# Patient Record
Sex: MALE | Race: WHITE | Employment: UNEMPLOYED | URBAN - METROPOLITAN AREA
[De-identification: names, ages, dates, MRNs, and addresses within clinical notes are randomized per-mention and may not be internally consistent; named-entity substitution may affect disease eponyms.]

---
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
## 2021-08-02 ENCOUNTER — HOSPITAL ENCOUNTER (OUTPATIENT)
Age: 21
Setting detail: OBSERVATION
Discharge: PSYCHIATRIC OR SUBSTANCE USE FACILITY | DRG: 754 | End: 2021-08-03
Attending: EMERGENCY MEDICINE | Admitting: INTERNAL MEDICINE
Payer: COMMERCIAL

## 2021-08-02 DIAGNOSIS — T43.8X2A: Primary | ICD-10-CM

## 2021-08-02 PROBLEM — T50.901A POLYSUBSTANCE OVERDOSE: Status: ACTIVE | Noted: 2021-08-02

## 2021-08-02 LAB
ALBUMIN SERPL-MCNC: 3.5 G/DL (ref 3.5–5)
ALBUMIN/GLOB SERPL: 0.9 {RATIO} (ref 1.1–2.2)
ALP SERPL-CCNC: 78 U/L (ref 45–117)
ALT SERPL-CCNC: 53 U/L (ref 12–78)
AMPHET UR QL SCN: NEGATIVE
ANION GAP SERPL CALC-SCNC: 10 MMOL/L (ref 5–15)
APAP SERPL-MCNC: <2 UG/ML (ref 10–30)
AST SERPL-CCNC: 24 U/L (ref 15–37)
BARBITURATES UR QL SCN: NEGATIVE
BASOPHILS # BLD: 0.1 K/UL (ref 0–0.1)
BASOPHILS NFR BLD: 1 % (ref 0–1)
BENZODIAZ UR QL: NEGATIVE
BILIRUB SERPL-MCNC: 0.4 MG/DL (ref 0.2–1)
BUN SERPL-MCNC: 14 MG/DL (ref 6–20)
BUN/CREAT SERPL: 17 (ref 12–20)
CALCIUM SERPL-MCNC: 8.9 MG/DL (ref 8.5–10.1)
CANNABINOIDS UR QL SCN: NEGATIVE
CHLORIDE SERPL-SCNC: 107 MMOL/L (ref 97–108)
CK SERPL-CCNC: 101 U/L (ref 39–308)
CO2 SERPL-SCNC: 24 MMOL/L (ref 21–32)
COCAINE UR QL SCN: NEGATIVE
CREAT SERPL-MCNC: 0.83 MG/DL (ref 0.7–1.3)
DIFFERENTIAL METHOD BLD: ABNORMAL
DRUG SCRN COMMENT,DRGCM: NORMAL
EOSINOPHIL # BLD: 0.1 K/UL (ref 0–0.4)
EOSINOPHIL NFR BLD: 1 % (ref 0–7)
ERYTHROCYTE [DISTWIDTH] IN BLOOD BY AUTOMATED COUNT: 12.5 % (ref 11.5–14.5)
ETHANOL SERPL-MCNC: <10 MG/DL
GLOBULIN SER CALC-MCNC: 4.1 G/DL (ref 2–4)
GLUCOSE SERPL-MCNC: 105 MG/DL (ref 65–100)
HCT VFR BLD AUTO: 45.7 % (ref 36.6–50.3)
HGB BLD-MCNC: 16.1 G/DL (ref 12.1–17)
IMM GRANULOCYTES # BLD AUTO: 0.1 K/UL (ref 0–0.04)
IMM GRANULOCYTES NFR BLD AUTO: 1 % (ref 0–0.5)
LYMPHOCYTES # BLD: 2.3 K/UL (ref 0.8–3.5)
LYMPHOCYTES NFR BLD: 23 % (ref 12–49)
MAGNESIUM SERPL-MCNC: 1.8 MG/DL (ref 1.6–2.4)
MCH RBC QN AUTO: 30.7 PG (ref 26–34)
MCHC RBC AUTO-ENTMCNC: 35.2 G/DL (ref 30–36.5)
MCV RBC AUTO: 87.2 FL (ref 80–99)
METHADONE UR QL: NEGATIVE
MONOCYTES # BLD: 0.9 K/UL (ref 0–1)
MONOCYTES NFR BLD: 9 % (ref 5–13)
NEUTS SEG # BLD: 6.8 K/UL (ref 1.8–8)
NEUTS SEG NFR BLD: 65 % (ref 32–75)
NRBC # BLD: 0 K/UL (ref 0–0.01)
NRBC BLD-RTO: 0 PER 100 WBC
OPIATES UR QL: NEGATIVE
PCP UR QL: NEGATIVE
PLATELET # BLD AUTO: 276 K/UL (ref 150–400)
PMV BLD AUTO: 9.1 FL (ref 8.9–12.9)
POTASSIUM SERPL-SCNC: 4.1 MMOL/L (ref 3.5–5.1)
PROT SERPL-MCNC: 7.6 G/DL (ref 6.4–8.2)
RBC # BLD AUTO: 5.24 M/UL (ref 4.1–5.7)
SALICYLATES SERPL-MCNC: <1.7 MG/DL (ref 2.8–20)
SODIUM SERPL-SCNC: 141 MMOL/L (ref 136–145)
TROPONIN I SERPL-MCNC: <0.05 NG/ML
WBC # BLD AUTO: 10.2 K/UL (ref 4.1–11.1)

## 2021-08-02 PROCEDURE — 82077 ASSAY SPEC XCP UR&BREATH IA: CPT

## 2021-08-02 PROCEDURE — 99218 HC RM OBSERVATION: CPT

## 2021-08-02 PROCEDURE — 36415 COLL VENOUS BLD VENIPUNCTURE: CPT

## 2021-08-02 PROCEDURE — 80053 COMPREHEN METABOLIC PANEL: CPT

## 2021-08-02 PROCEDURE — 80179 DRUG ASSAY SALICYLATE: CPT

## 2021-08-02 PROCEDURE — 99285 EMERGENCY DEPT VISIT HI MDM: CPT

## 2021-08-02 PROCEDURE — 93005 ELECTROCARDIOGRAM TRACING: CPT

## 2021-08-02 PROCEDURE — 85025 COMPLETE CBC W/AUTO DIFF WBC: CPT

## 2021-08-02 PROCEDURE — 80143 DRUG ASSAY ACETAMINOPHEN: CPT

## 2021-08-02 PROCEDURE — 80307 DRUG TEST PRSMV CHEM ANLYZR: CPT

## 2021-08-02 PROCEDURE — 82550 ASSAY OF CK (CPK): CPT

## 2021-08-02 PROCEDURE — 84484 ASSAY OF TROPONIN QUANT: CPT

## 2021-08-02 PROCEDURE — 83735 ASSAY OF MAGNESIUM: CPT

## 2021-08-02 RX ORDER — LANOLIN ALCOHOL/MO/W.PET/CERES
3 CREAM (GRAM) TOPICAL
Status: DISCONTINUED | OUTPATIENT
Start: 2021-08-02 | End: 2021-08-03 | Stop reason: HOSPADM

## 2021-08-02 RX ORDER — ENOXAPARIN SODIUM 100 MG/ML
40 INJECTION SUBCUTANEOUS DAILY
Status: DISCONTINUED | OUTPATIENT
Start: 2021-08-03 | End: 2021-08-03 | Stop reason: HOSPADM

## 2021-08-02 RX ORDER — FLUOXETINE HYDROCHLORIDE 20 MG/1
20 CAPSULE ORAL 3 TIMES DAILY
COMMUNITY
End: 2021-08-13

## 2021-08-02 RX ORDER — SODIUM CHLORIDE 0.9 % (FLUSH) 0.9 %
5-40 SYRINGE (ML) INJECTION AS NEEDED
Status: DISCONTINUED | OUTPATIENT
Start: 2021-08-02 | End: 2021-08-03 | Stop reason: HOSPADM

## 2021-08-02 RX ORDER — POLYETHYLENE GLYCOL 3350 17 G/17G
17 POWDER, FOR SOLUTION ORAL DAILY PRN
Status: DISCONTINUED | OUTPATIENT
Start: 2021-08-02 | End: 2021-08-03 | Stop reason: HOSPADM

## 2021-08-02 RX ORDER — SODIUM CHLORIDE 0.9 % (FLUSH) 0.9 %
5-40 SYRINGE (ML) INJECTION EVERY 8 HOURS
Status: DISCONTINUED | OUTPATIENT
Start: 2021-08-02 | End: 2021-08-03 | Stop reason: HOSPADM

## 2021-08-02 RX ORDER — SODIUM CHLORIDE 450 MG/100ML
75 INJECTION, SOLUTION INTRAVENOUS CONTINUOUS
Status: DISCONTINUED | OUTPATIENT
Start: 2021-08-02 | End: 2021-08-03 | Stop reason: HOSPADM

## 2021-08-03 ENCOUNTER — HOSPITAL ENCOUNTER (INPATIENT)
Age: 21
LOS: 10 days | Discharge: HOME | DRG: 754 | End: 2021-08-13
Attending: PSYCHIATRY & NEUROLOGY | Admitting: PSYCHIATRY & NEUROLOGY
Payer: COMMERCIAL

## 2021-08-03 VITALS
HEART RATE: 79 BPM | BODY MASS INDEX: 33.25 KG/M2 | SYSTOLIC BLOOD PRESSURE: 119 MMHG | DIASTOLIC BLOOD PRESSURE: 73 MMHG | TEMPERATURE: 98.2 F | RESPIRATION RATE: 16 BRPM | HEIGHT: 71 IN | OXYGEN SATURATION: 99 % | WEIGHT: 237.5 LBS

## 2021-08-03 DIAGNOSIS — F43.10 PTSD (POST-TRAUMATIC STRESS DISORDER): ICD-10-CM

## 2021-08-03 DIAGNOSIS — F43.21 ADJUSTMENT DISORDER WITH DEPRESSED MOOD: ICD-10-CM

## 2021-08-03 DIAGNOSIS — F60.3 BORDERLINE PERSONALITY DISORDER (HCC): ICD-10-CM

## 2021-08-03 PROBLEM — F32.A DEPRESSION: Status: ACTIVE | Noted: 2021-08-03

## 2021-08-03 LAB
ALBUMIN SERPL-MCNC: 3.3 G/DL (ref 3.5–5)
ALBUMIN/GLOB SERPL: 0.9 {RATIO} (ref 1.1–2.2)
ALP SERPL-CCNC: 74 U/L (ref 45–117)
ALT SERPL-CCNC: 47 U/L (ref 12–78)
ANION GAP SERPL CALC-SCNC: 11 MMOL/L (ref 5–15)
APAP SERPL-MCNC: <2 UG/ML (ref 10–30)
AST SERPL-CCNC: 21 U/L (ref 15–37)
ATRIAL RATE: 90 BPM
BILIRUB SERPL-MCNC: 0.3 MG/DL (ref 0.2–1)
BUN SERPL-MCNC: 12 MG/DL (ref 6–20)
BUN/CREAT SERPL: 15 (ref 12–20)
CALCIUM SERPL-MCNC: 8.5 MG/DL (ref 8.5–10.1)
CALCULATED P AXIS, ECG09: 56 DEGREES
CALCULATED R AXIS, ECG10: 15 DEGREES
CALCULATED T AXIS, ECG11: 19 DEGREES
CHLORIDE SERPL-SCNC: 104 MMOL/L (ref 97–108)
CO2 SERPL-SCNC: 25 MMOL/L (ref 21–32)
CREAT SERPL-MCNC: 0.81 MG/DL (ref 0.7–1.3)
DIAGNOSIS, 93000: NORMAL
ERYTHROCYTE [DISTWIDTH] IN BLOOD BY AUTOMATED COUNT: 12.6 % (ref 11.5–14.5)
FLUAV RNA SPEC QL NAA+PROBE: NOT DETECTED
FLUBV RNA SPEC QL NAA+PROBE: NOT DETECTED
GLOBULIN SER CALC-MCNC: 3.8 G/DL (ref 2–4)
GLUCOSE SERPL-MCNC: 120 MG/DL (ref 65–100)
HCT VFR BLD AUTO: 45.3 % (ref 36.6–50.3)
HGB BLD-MCNC: 15.7 G/DL (ref 12.1–17)
MCH RBC QN AUTO: 30.3 PG (ref 26–34)
MCHC RBC AUTO-ENTMCNC: 34.7 G/DL (ref 30–36.5)
MCV RBC AUTO: 87.5 FL (ref 80–99)
NRBC # BLD: 0 K/UL (ref 0–0.01)
NRBC BLD-RTO: 0 PER 100 WBC
P-R INTERVAL, ECG05: 158 MS
PLATELET # BLD AUTO: 250 K/UL (ref 150–400)
PMV BLD AUTO: 9 FL (ref 8.9–12.9)
POTASSIUM SERPL-SCNC: 3.5 MMOL/L (ref 3.5–5.1)
PROT SERPL-MCNC: 7.1 G/DL (ref 6.4–8.2)
Q-T INTERVAL, ECG07: 344 MS
QRS DURATION, ECG06: 86 MS
QTC CALCULATION (BEZET), ECG08: 420 MS
RBC # BLD AUTO: 5.18 M/UL (ref 4.1–5.7)
SALICYLATES SERPL-MCNC: <1.7 MG/DL (ref 2.8–20)
SARS-COV-2, COV2: NOT DETECTED
SODIUM SERPL-SCNC: 140 MMOL/L (ref 136–145)
VENTRICULAR RATE, ECG03: 90 BPM
WBC # BLD AUTO: 9.8 K/UL (ref 4.1–11.1)

## 2021-08-03 PROCEDURE — 80053 COMPREHEN METABOLIC PANEL: CPT

## 2021-08-03 PROCEDURE — 36415 COLL VENOUS BLD VENIPUNCTURE: CPT

## 2021-08-03 PROCEDURE — 65220000003 HC RM SEMIPRIVATE PSYCH

## 2021-08-03 PROCEDURE — 80143 DRUG ASSAY ACETAMINOPHEN: CPT

## 2021-08-03 PROCEDURE — 80179 DRUG ASSAY SALICYLATE: CPT

## 2021-08-03 PROCEDURE — 85027 COMPLETE CBC AUTOMATED: CPT

## 2021-08-03 PROCEDURE — 93005 ELECTROCARDIOGRAM TRACING: CPT

## 2021-08-03 PROCEDURE — 74011000250 HC RX REV CODE- 250: Performed by: INTERNAL MEDICINE

## 2021-08-03 PROCEDURE — 99218 HC RM OBSERVATION: CPT

## 2021-08-03 PROCEDURE — 87636 SARSCOV2 & INF A&B AMP PRB: CPT

## 2021-08-03 RX ORDER — HALOPERIDOL 5 MG/ML
5 INJECTION INTRAMUSCULAR
Status: DISCONTINUED | OUTPATIENT
Start: 2021-08-03 | End: 2021-08-13 | Stop reason: HOSPADM

## 2021-08-03 RX ORDER — IBUPROFEN 400 MG/1
400 TABLET ORAL
Status: DISCONTINUED | OUTPATIENT
Start: 2021-08-03 | End: 2021-08-03 | Stop reason: HOSPADM

## 2021-08-03 RX ORDER — ACETAMINOPHEN 325 MG/1
650 TABLET ORAL
Status: DISCONTINUED | OUTPATIENT
Start: 2021-08-03 | End: 2021-08-13 | Stop reason: HOSPADM

## 2021-08-03 RX ORDER — DIPHENHYDRAMINE HYDROCHLORIDE 50 MG/ML
50 INJECTION, SOLUTION INTRAMUSCULAR; INTRAVENOUS
Status: DISCONTINUED | OUTPATIENT
Start: 2021-08-03 | End: 2021-08-13 | Stop reason: HOSPADM

## 2021-08-03 RX ORDER — LORAZEPAM 2 MG/ML
1 INJECTION, SOLUTION INTRAMUSCULAR; INTRAVENOUS
Status: DISCONTINUED | OUTPATIENT
Start: 2021-08-03 | End: 2021-08-13 | Stop reason: HOSPADM

## 2021-08-03 RX ORDER — ADHESIVE BANDAGE
30 BANDAGE TOPICAL DAILY PRN
Status: DISCONTINUED | OUTPATIENT
Start: 2021-08-03 | End: 2021-08-13 | Stop reason: HOSPADM

## 2021-08-03 RX ORDER — HYDROXYZINE 25 MG/1
50 TABLET, FILM COATED ORAL
Status: DISCONTINUED | OUTPATIENT
Start: 2021-08-03 | End: 2021-08-13 | Stop reason: HOSPADM

## 2021-08-03 RX ORDER — BENZTROPINE MESYLATE 1 MG/1
1 TABLET ORAL
Status: DISCONTINUED | OUTPATIENT
Start: 2021-08-03 | End: 2021-08-13 | Stop reason: HOSPADM

## 2021-08-03 RX ORDER — TRAZODONE HYDROCHLORIDE 50 MG/1
50 TABLET ORAL
Status: DISCONTINUED | OUTPATIENT
Start: 2021-08-03 | End: 2021-08-05

## 2021-08-03 RX ORDER — OLANZAPINE 5 MG/1
5 TABLET ORAL
Status: DISCONTINUED | OUTPATIENT
Start: 2021-08-03 | End: 2021-08-13 | Stop reason: HOSPADM

## 2021-08-03 RX ADMIN — Medication 10 ML: at 00:41

## 2021-08-03 RX ADMIN — SODIUM CHLORIDE 75 ML/HR: 4.5 INJECTION, SOLUTION INTRAVENOUS at 00:40

## 2021-08-03 NOTE — PROGRESS NOTES
IDT met to discuss plan of care. RUR-   LOS- 1d  GLOS- observation  Barriers- None  Plans- Has IV, EKG today, plan to transfer to 3rd floor today.   Krystle Tim, BSW/CM  [phone]

## 2021-08-03 NOTE — GROUP NOTE
JOON  GROUP DOCUMENTATION INDIVIDUAL                                                                          Group Therapy Note    Date: 8/3/2021    Group Start Time: 1500  Group End Time: 1600  Group Topic: Recreational/Music Therapy    [facility] [place] [id number] ACUTE [facility]    [place], [address] [facility] GROUP DOCUMENTATION GROUP    Group Therapy Note    Attendees: 5         Attendance: Attended    Patient's Goal:  To concentrate on selected task    Interventions/techniques: Supported-crafts,games,music    Follows Directions: Followed directions    Interactions: Interacted appropriately    Mental Status: Calm and Flat    Behavior/appearance: Attentive, Cooperative and Needed prompting    Goals Achieved: Able to engage in interactions and Able to listen to others      Additional Notes:   Active participant in game    Andrey Gordon
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
## 2021-08-03 NOTE — ROUTINE PROCESS
TRANSFER - IN REPORT:    Verbal report received from Paulie, RN(name) on Reina Gong  being received from ED(unit) for routine progression of care      Report consisted of patients Situation, Background, Assessment and   Recommendations(SBAR). Information from the following report(s) SBAR, Kardex, ED Summary, Intake/Output, MAR and Recent Results was reviewed with the receiving nurse. Opportunity for questions and clarification was provided. Assessment completed upon patients arrival to unit and care assumed. Patient resting quietly in bed, sitter at bedside.

## 2021-08-03 NOTE — PROGRESS NOTES
0710) Bedside shift change report given to Ivana Zimmerman, RN (oncoming nurse) by Pablo Flower RN (offgoing nurse). Report included the following information SBAR, Kardex and MAR. 1010) IDR with Dr. Vinnie Mg (MD), Agnieszka David (pharmacist), Jayden Sotelo (), Kristen Mari (wound RN/infection control), and Ivana Zimmerman (RN) to discuss plan of care including plan to discharge to [facility], call to poison control  [phone]) Call to [facility], [address] [facility] to discuss EKG results, vitals, planning for the patient. No current symptoms. 1040) Discuss with Dr. Vinnie Mg, repeat labs not needed. Planning for discharge  [phone]) Pt discharged to [facility] with security and sitter .

## 2021-08-03 NOTE — BH NOTES
GROUP THERAPY PROGRESS NOTE    Patient is participating in Recreational Therapy/Coping Skills Group. Group time: 50 minutes      Personal goal for participation: To concentrate on selected task; positively engage in interactions with others; actively listen to others; self-disclose thoughts, emotions and behaviors; and discuss coping skills. Goal orientation: Personal    Group therapy participation: active    Therapeutic interventions reviewed and discussed: Group members participated in a music, art and game recreational therapy activity. Patients were able to actively participate in activity while engaging in conversation with staff and other patients. Patients appropriately socialized and processed presenting problems and coping skills while engaging in activity. Impression of participation: Pt with depressed mood, full affect, clear speech, goal directed thought stream, calm and cooperative, interacted appropriately with others. Pt completed art activity and processed his personal coping skills. Pt states he likes music, video games and watching TV. Pt processed memories from the past and goals for future. Pt asked appropriate questions to other patients.     MICHAEL Nance
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
## 2021-08-03 NOTE — PROGRESS NOTES
Reason for Admission:  Yazmin Coburn is a 21 y.o.  male who presents with above. Pt says that he is dealing with a lot of things \"too many things to get into. \" Reported overdose with 50 prozac tabs to ER tonight. Also had plan to jump from overpass                     RUR Score:                     Plan for utilizing home health:   none       PCP: First and Last name:  No primary care provider on file. Name of Practice:    Are you a current patient: Yes/No:    Approximate date of last visit:    Can you participate in a virtual visit with your PCP:                     Current Advanced Directive/Advance Care Plan: Full Code      Healthcare Decision Maker:   Click here to complete [address] including selection of the Healthcare Decision Maker Relationship (ie \"Primary\")                             Transition of Care Plan:      Met with patient for assessment. Had been living with 5 roommates at [address] [place] [address] in [facility]. Stated that he does not wish to return there and they were the cause of his distress leading to SI attempt. Release a month ago from [facility]. He was there for 3 years. Does not have a regular PCP. Takes Prozac and Vitamin D that were provided by [facility] when he was released. No working and stated that he has never worked. No income, received food stamps. CM discussed OBS status and patient signed OBS letter which was placed in chart and noted on Doc List.    Plan  1/ Patient to be transferred to 3rd floor for continued care. Care Management Interventions  PCP Verified by CM: Yes  Transition of Care Consult (CM Consult): Discharge Planning  Current Support Network:  Other (Lives with 3 roomates- does not want to return to this situation)  The Plan for Transition of Care is Related to the Following Treatment Goals : Disposition, Mental Health Services, Establish PCP  The Patient and/or Patient Representative was Provided with a Choice of Provider and Agrees with the Discharge Plan?: Yes  Name of the Patient Representative Who was Provided with a Choice of Provider and Agrees with the Discharge Plan: Patient  Discharge Location  Discharge Placement: Psychiatric Unit     SHRUTHI Lowe/ELLIS  [phone]

## 2021-08-03 NOTE — ED NOTES
Patient changed into green gown and hospital socks. Patient belongings secured in x 2 patient belonging bags with patient labels. Room check done. Patient calm and cooperative.

## 2021-08-03 NOTE — ED NOTES
TRANSFER - OUT REPORT:    Verbal report given to BEKAH Dunbar on [employer]  being transferred to [facility] Surg Observation for routine progression of care       Report consisted of patients Situation, Background, Assessment and   Recommendations(SBAR). Information from the following report(s) SBAR, Intake/Output, MAR, Recent Results, Med Rec Status and Cardiac Rhythm NSR was reviewed with the receiving nurse. Lines:       Opportunity for questions and clarification was provided.       Patient transported with:   Registered Nurse
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
## 2021-08-03 NOTE — PROGRESS NOTES
I went in the patients room and removed all ligature risks that could be removed except those needed to care for the patient. Patient is currently 1:1 with a U RN.

## 2021-08-03 NOTE — PROGRESS NOTES
.. TRANSFER - OUT REPORT:    Verbal report given to Trish(name) on Varun Rivas  being transferred to [facility]) for routine progression of care       Report consisted of patients Situation, Background, Assessment and   Recommendations(SBAR). Information from the following report(s) SBAR, Kardex and MAR was reviewed with the receiving nurse. Lines:   Peripheral IV 08/03/21 Left Antecubital (Active)   Site Assessment Clean, dry, & intact 08/03/21 0048   Phlebitis Assessment 0 08/03/21 0048   Infiltration Assessment 0 08/03/21 0048   Dressing Status Clean, dry, & intact 08/03/21 0048   Dressing Type Tape;Transparent 08/03/21 0048   Hub Color/Line Status Pink;Capped; Infusing 08/03/21 0048   Alcohol Cap Used No 08/03/21 0048        Opportunity for questions and clarification was provided.       Patient transported with:  Daylin Fitzpatrick

## 2021-08-03 NOTE — PROGRESS NOTES
Problem: Activity Intolerance  Goal: *Oxygen saturation during activity within specified parameters  Outcome: Progressing Towards Goal  Goal: *Able to remain out of bed as prescribed  Outcome: Progressing Towards Goal     Problem: Falls - Risk of  Goal: *Absence of Falls  Description: Document [place] Josiane Fall Risk and appropriate interventions in the flowsheet.   Outcome: Progressing Towards Goal  Note: Fall Risk Interventions:                                Problem: Pain  Goal: *Control of Pain  Outcome: Progressing Towards Goal  Goal: *PALLIATIVE CARE:  Alleviation of Pain  Outcome: Progressing Towards Goal

## 2021-08-03 NOTE — GROUP NOTE
JOON  GROUP DOCUMENTATION INDIVIDUAL                                                                          Group Therapy Note    Date: 8/3/2021    Group Start Time: 1100  Group End Time: 1200  Group Topic: Topic Group    [address] ACUTE BEHAV [place] [address] GROUP    Group Therapy Note    Attendees: 4         Attendance: Did not attend    Patient's Goal:      Interventions/techniquesZaria Maldonado
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
## 2021-08-03 NOTE — ED NOTES
X 1 forensic restraint placed on patient by [place] [facility] to patient's L wrist connected to side rail. Skin intact, no breakdown noted. Will continue to monitor.
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
## 2021-08-04 PROBLEM — F43.21 ADJUSTMENT DISORDER WITH DEPRESSED MOOD: Status: ACTIVE | Noted: 2021-08-04

## 2021-08-04 PROBLEM — F32.A DEPRESSION: Status: RESOLVED | Noted: 2021-08-03 | Resolved: 2021-08-04

## 2021-08-04 PROBLEM — F60.3 BORDERLINE PERSONALITY DISORDER (HCC): Status: ACTIVE | Noted: 2021-08-04

## 2021-08-04 PROBLEM — F43.10 PTSD (POST-TRAUMATIC STRESS DISORDER): Status: ACTIVE | Noted: 2021-08-04

## 2021-08-04 LAB
ATRIAL RATE: 70 BPM
CALCULATED P AXIS, ECG09: 53 DEGREES
CALCULATED R AXIS, ECG10: 19 DEGREES
CALCULATED T AXIS, ECG11: 13 DEGREES
CHOLEST SERPL-MCNC: 163 MG/DL
DIAGNOSIS, 93000: NORMAL
GLUCOSE P FAST SERPL-MCNC: 85 MG/DL (ref 65–100)
HDLC SERPL-MCNC: 37 MG/DL
HDLC SERPL: 4.4 {RATIO} (ref 0–5)
LDLC SERPL CALC-MCNC: 55.2 MG/DL (ref 0–100)
P-R INTERVAL, ECG05: 164 MS
Q-T INTERVAL, ECG07: 398 MS
QRS DURATION, ECG06: 90 MS
QTC CALCULATION (BEZET), ECG08: 429 MS
TRIGL SERPL-MCNC: 354 MG/DL (ref ?–150)
TSH SERPL DL<=0.05 MIU/L-ACNC: 1.36 UIU/ML (ref 0.36–3.74)
VENTRICULAR RATE, ECG03: 70 BPM
VLDLC SERPL CALC-MCNC: 70.8 MG/DL

## 2021-08-04 PROCEDURE — 80061 LIPID PANEL: CPT

## 2021-08-04 PROCEDURE — 65220000003 HC RM SEMIPRIVATE PSYCH

## 2021-08-04 PROCEDURE — 82947 ASSAY GLUCOSE BLOOD QUANT: CPT

## 2021-08-04 PROCEDURE — 99223 1ST HOSP IP/OBS HIGH 75: CPT | Performed by: PSYCHIATRY & NEUROLOGY

## 2021-08-04 PROCEDURE — 36415 COLL VENOUS BLD VENIPUNCTURE: CPT

## 2021-08-04 PROCEDURE — 84443 ASSAY THYROID STIM HORMONE: CPT

## 2021-08-04 NOTE — BH NOTES
0800 Report given from Marissa Vergara. Pt is visible on the unit in the dayroom watching tv.     1000 Pt is visible on the unit. Calm and cooperative. Pt does state he is anxious 2/10 and depression 2/10. Pt denies si/hi/a/v mendoza. Attending groups and socializing with peers. Accepting meals. 1200 pt ate lunch. Visible on the unit watching tv.    1400 pt is visible on the unit in the dayroom. 1600 pt is visible on the unit. Ate dinner. 1800 Pt is visible on the unit. Calm and cooperative.

## 2021-08-04 NOTE — BH NOTES
GROUP THERAPY PROGRESS NOTE    Patient is participating in Coping Skills group. Group time: 45 minutes    Personal goal for participation: To practice naming emotions and processing memories of emotions. Goal orientation: Personal    Group therapy participation: active    Therapeutic interventions reviewed and discussed: Group members worked on a word search of feeling words/emotions. After a they had time to find some of the words patients were asked to read the list of emotions/feeling words and share which word they reacted to the most. Each member was able to share a memory or statement they try to remember for each word. Impression of participation: Michelle Bo was present in group. He shared that he enjoys games and named a bunch of games that were card games like [place] [id number]. He shared that he enjoys anime and other comics. He interacted appropriately with others in group and was very active in discussion.     Susan Yuan LPC LSATP [facility]

## 2021-08-04 NOTE — PROGRESS NOTES
Problem: Suicide  Goal: *STG: Remains safe in hospital  Outcome: Progressing Towards Goal  Goal: *STG: Seeks staff when feelings of self harm or harm towards others arise  Outcome: Progressing Towards Goal  Goal: *STG: Attends activities and groups  Outcome: Progressing Towards Goal  Goal: *STG:  Verbalizes alternative ways of dealing with maladaptive feelings/behaviors  Outcome: Progressing Towards Goal  Goal: *STG/LTG: Complies with medication therapy  Outcome: Progressing Towards Goal     Problem: Anxiety  Goal: *Alleviation of anxiety  Outcome: Progressing Towards Goal  Goal: *Alleviation of anxiety (Palliative Care)  Outcome: Progressing Towards Goal     Problem: Depressed Mood (Adult/Pediatric)  Goal: *STG: Participates in treatment plan  Outcome: Progressing Towards Goal  Goal: *STG: Participates in 1:1 therapy sessions  Outcome: Progressing Towards Goal  Goal: *STG: Verbalizes anger, guilt, and other feelings in a constructive manor  Outcome: Progressing Towards Goal  Goal: *STG: Attends activities and groups  Outcome: Progressing Towards Goal

## 2021-08-04 NOTE — PROGRESS NOTES
Problem: Discharge Planning  Goal: *Discharge to safe environment  Outcome: Not Progressing Towards Goal  Note: Patient  states he will not be returning to the [facility] house and does not report other options. Goal: *Knowledge of medication management  Outcome: Progressing Towards Goal  Note: Patient verbalizes understanding of medication regimen. Patient is taking medications as prescribed. Goal: *Knowledge of discharge instructions  Outcome: Progressing Towards Goal  Note: Patient verbalizes understanding of goals for treatment and safe discharge.
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
## 2021-08-04 NOTE — BH NOTES
GROUP THERAPY PROGRESS NOTE    Patient is participating in Coping Skills group. Group time: 45 minutes     Personal goal for participation: Learn coping skills to improve mental health, reduce stress and work through uncomfortable emotions    Goal orientation: Personal    Group therapy participation: active    Therapeutic interventions reviewed and discussed: Group discussion on ways patients are coping with mental health needs, stress and uncomfortable emotions. Group members discussed alternative positive coping skills using each letter of the alphabet, resulting in patients having a list of 26+ positive coping skills to access. Patients discussed the difference between positive and negative coping skills. Impression of participation: Pt with odd affect, anxious mood, clear speech, goal directed thought stream, limited insight and judgement. Pt reported needing to cope with violating parole for not calling hospital within 24 hours of changing location and is concerned about the consequences. Pt processed hx of self harm and stated he wants to get body stitching done. Pt has limited insight into self-harm as a negative coping skill. Pt processed hx of incarceration and being a registered sex offender. Pt processed positive ways to cope with mental health challenging including therapy, reading, talking to others.     MICHAEL Roche

## 2021-08-04 NOTE — PROGRESS NOTES
Behavioral Services  Medicare Certification Upon Admission    I certify that this patient's inpatient psychiatric hospital admission is medically necessary for:    [x] (1) Treatment which could reasonably be expected to improve this patient's condition,       [x] (2) Or for diagnostic study;     AND     [x](2) The inpatient psychiatric services are provided while the individual is under the care of a physician and are included in the individualized plan of care.     Estimated length of stay/service 3-5 days    Plan for post-hospital care home    Electronically signed by Thu Medina MD on 8/4/2021 at 9:53 AM

## 2021-08-04 NOTE — PROGRESS NOTES
Problem: Suicide  Goal: *STG: Remains safe in hospital  Outcome: Progressing Towards Goal     Problem: Depressed Mood (Adult/Pediatric)  Goal: *STG: Participates in treatment plan  Outcome: Progressing Towards Goal  Goal: *STG: Remains safe in hospital  Outcome: Progressing Towards Goal     Verbal shift change report given to Kenisha carson (oncoming nurse) by Venkat Yin (offgoing nurse). Report included the following information SBAR, Kardex, MAR, and Recent Results. Patient denies SI/HI/AVH. Patient sitting in day room conversing appropriately with peers. Patient endorsed mild anxiety 3/10 and when asked about depression, patient stated, \"I'm not sad, I just don't care about anything. \" Patient presents as somewhat guarded at this time. Patient noted that he has trouble sleeping and has tried trazodone, melatonin and ambien without success. No further issues noted at this time. [id number] Patient cooperative and labs obtained. No further issues noted at this time.

## 2021-08-04 NOTE — BH NOTES
GROUP THERAPY PROGRESS NOTE    Patient is participating in Discharge Planning Group. Group time: 30 minutes    Personal goal for participation: Process feelings related to discharge and/or feelings/goals for today. Goal orientation: Personal    Group therapy participation: active    Therapeutic interventions reviewed and discussed: Group discussion was focused on discharge plans and anxiety related to this. Group members discussed what they planned to do once discharged and discharge plans. Patients discussed their goals for today and what they are working on with treatment team to get ready for discharge. Patients were reminded of things to consider including who they will see outpatient (psychiatrist/NP, , therapist/counselor), where they will go, how they will get there, and where they want to get their medications from. Patients were also reminded or informed who their treatment team is while they are in the hospital, some important rules on the unit, and the schedule for todays groups and meals. Impression of participation: Hilaria Lesches was present in group. He shared his coping skills and asked questions about admission. He was informed of his treatment team members and that he would need to speak to them about his concerns for where to live and how to get there. He reported understanding of the unit, schedule, and discharge process.     Myrna Childress [facility]

## 2021-08-04 NOTE — GROUP NOTE
JOON  GROUP DOCUMENTATION INDIVIDUAL                                                                          Group Therapy Note    Date: 8/4/2021    Group Start Time: 1000  Group End Time: 1100  Group Topic: Topic Group    [address] [facility] 3 ACUTE BEHAV [place], [address] [facility] GROUP DOCUMENTATION GROUP    Group Therapy Note    Attendees: 6         Attendance: Attended    Patient's Goal:  To identify positive and negative coping skills    Interventions/techniques: Supported-discussion        Interactions: Interacted appropriately    Mental Status: Calm and Flat    Behavior/appearance: Cooperative and Needed prompting    Goals Achieved: Able to engage in interactions, Able to listen to others, Able to self-disclose and Discussed coping      Additional Notes:      Andrey Gordon

## 2021-08-04 NOTE — GROUP NOTE
JOON  GROUP DOCUMENTATION INDIVIDUAL                                                                          Group Therapy Note    Date: 8/4/2021    Group Start Time: 1400  Group End Time: 1500  Group Topic: Recreational/Music Therapy    [facility] [place] [address] ACUTE BEHAV [facility]    [place], [address] [facility] GROUP DOCUMENTATION GROUP    Group Therapy Note    Attendees: 8         Attendance: Attended    Patient's Goal:  To concentrate on selected task    Interventions/techniques: Supported-crafts,games,music        Interactions: Interacted appropriately    Mental Status: Calm and Flat    Behavior/appearance: Needed prompting    Goals Achieved:  Attended group when encouraged      Additional Notes:  Arrived late -reported feeling tired,elected to watch    GenSpera

## 2021-08-05 PROCEDURE — 65220000003 HC RM SEMIPRIVATE PSYCH

## 2021-08-05 PROCEDURE — 74011250637 HC RX REV CODE- 250/637: Performed by: PSYCHIATRY & NEUROLOGY

## 2021-08-05 RX ORDER — DOXEPIN HYDROCHLORIDE 10 MG/1
10 CAPSULE ORAL
Status: DISCONTINUED | OUTPATIENT
Start: 2021-08-05 | End: 2021-08-06

## 2021-08-05 RX ADMIN — DOXEPIN HYDROCHLORIDE 10 MG: 10 CAPSULE ORAL at 21:42

## 2021-08-05 NOTE — BH NOTES
Pt is A&OX4. Pt denies SI/HI. A/VH= \" Pt states, I don't know what's real or not real, I don't trust myself. \"  Last BM=yesterday. Pt is visible in the milieu, mostly watching tv in the dayroom. No issues observed or reported. Pt stays to himself. Will continue to monitor. Pt is diet compliant.

## 2021-08-05 NOTE — PROGRESS NOTES
Problem: Suicide  Goal: *STG: Remains safe in hospital  Outcome: Progressing Towards Goal  Goal: *STG: Seeks staff when feelings of self harm or harm towards others arise  Outcome: Progressing Towards Goal     Problem: Anxiety  Goal: *Alleviation of anxiety  Outcome: Progressing Towards Goal     1930- Verbal shift change report given to Giovanna Stoner.  (oncoming nurse) by Dimas Luna (offgoing nurse). Report included the following information SBAR, Kardex, MAR, and Recent Results. 2030- Patient denies SI/HI/AVH. Patient endorses anxiety and depression but states he feels much better than yesterday. 2300- Patient intermittently in day room and in hallway speaking to staff. No further issues noted at this time. 0130- Patient continues to stay awake. Writer asked patient if he wanted to try any medication to facilitate sleep. Patient politely declined. [id number] Patient appears asleep in bed at this time. No further issues noted presently.

## 2021-08-05 NOTE — BH NOTES
GROUP THERAPY PROGRESS NOTE    Patient did not participate in Substance abuse/Coping Skills group.      Ronaldo Mccoy LPC LSATP [facility]

## 2021-08-05 NOTE — GROUP NOTE
JOON  GROUP DOCUMENTATION INDIVIDUAL                                                                          Group Therapy Note    Date: 8/5/2021    Group Start Time: 1400  Group End Time: 1500  Group Topic: Recreational/Music Therapy    [facility] [place] [address] ACUTE BEHAV [facility]    [place], [address] [facility] GROUP DOCUMENTATION GROUP    Group Therapy Note    Attendees: 9         Attendance: Attended    Patient's Goal:  To concentrate on selected task    Interventions/techniques: Supported-crafts,games,music    Follows Directions:  Followed directions    Interactions: Interacted appropriately    Mental Status: Calm    Behavior/appearance: Attentive and Cooperative    Goals Achieved: Able to engage in interactions and Able to listen to others      Additional Notes:  Pleasant-socialized with peers and Jim Acuna

## 2021-08-05 NOTE — BH NOTES
Behavioral Health Treatment Team Note         Patient goal(s) for today: Take medications as prescribed. Treatment team focus/goals: Medication adjustments. Progress note Pt reports he remains anxious and overwhelmed. SW left voicemail's for pts PO and PO supervisors. LOS:  2  Expected LOS: TBD    Financial concerns/prescription coverage:[employer] [place] - Voluntary  Date of last family contact: None     Family requesting physician contact today:  No  Discharge plan: TBD  Guns in the home: None reported. Outpatient provider(s):  To be linked    Participating treatment team members: Laverne Fatima, MSW and Dr. Arun Owens

## 2021-08-05 NOTE — BH NOTES
GROUP THERAPY PROGRESS NOTE    Patient is participating in Substance abuse/Coping Skills group. Group time: 50 minutes    Personal goal for participation: To understand values and how behaviors impact values    Goal orientation: Personal    Group therapy participation: minimal to active    Therapeutic interventions reviewed and discussed: Group discussion of values and how behaviors and actions have been in agreement or disagreement with values. Patients able to share their thoughts on values and value related questions designed for patients to explore their values and beliefs. Impression of participation: Husam Min was present in group. He reports that he is unsure of many of the questions asked of him. He reports that if he had unlimited fortune he would put it in an offshore account, get a new identity and move to a new country. When asked where he stated [place] because he wants to start over.     Dipti Landeros LPC LSATP [facility]

## 2021-08-06 PROCEDURE — 99232 SBSQ HOSP IP/OBS MODERATE 35: CPT | Performed by: PSYCHIATRY & NEUROLOGY

## 2021-08-06 PROCEDURE — 65220000003 HC RM SEMIPRIVATE PSYCH

## 2021-08-06 RX ORDER — DOXEPIN HYDROCHLORIDE 10 MG/1
10 CAPSULE ORAL
Status: DISCONTINUED | OUTPATIENT
Start: 2021-08-06 | End: 2021-08-13 | Stop reason: HOSPADM

## 2021-08-06 NOTE — BH NOTES
Psychiatric Progress Note    Patient: Amisha Saucedo MRN: [id number]  SSN: [id number]    YOB: 2000  Age: 21 y.o. Sex: male      Admit Date: 8/3/2021    LOS: 3 days     Subjective:     Amisha Saucedo  reports feeling tired and irritable due to the noise level on this unit. Moods are fair. Denies SI/HI/AH/VH. No aggression or violence. Appropriately interactive and aware. Tolerating medications well except that he feels none of them. Eating ok and sleeping fairly at 7 hrs.    8/6 - overnight, patient has been visible, he slept 7 hours and denies any depressed mood this morning. He is withdrawn per nursing, and reports anxiety at a '7/10' in intensity. Patient reports feeling confused and disoriented thought he is calm and pleasant on interview. He reports the Doxepin contributed to this, stating it is too intense of a medication. Patient discharge focused, asking what his options were upon release. He denies active thoughts of self harm.     Objective:     Vitals:    08/04/21 2050 08/05/21 0800 08/05/21 2030 08/06/21 0957   BP: 137/81 111/75 126/75 121/78   Pulse: 86 81 86 88   Resp: 16 16 14 16   Temp: 98.3 °F (36.8 °C) 98.4 °F (36.9 °C) 98.8 °F (37.1 °C) 98.9 °F (37.2 °C)   SpO2: 100% 100% 96% 99%   Weight:       Height:            Mental Status Exam:   Sensorium  oriented to time, place and person   Relations cooperative   Eye Contact appropriate   Appearance:  age appropriate   Speech:  normal volume and non-pressured   Thought Process: goal directed   Thought Content free of delusions, free of hallucinations, and internally preoccupied    Suicidal ideations none   Mood:  depressed   Affect:  constricted   Memory   adequate   Concentration:  adequate   Insight:  fair   Judgment:  limited       MEDICATIONS:  Current Facility-Administered Medications   Medication Dose Route Frequency    doxepin (SINEquan) capsule 10 mg  10 mg Oral QHS PRN    OLANZapine (ZyPREXA) tablet 5 mg  5 mg Oral Q6H PRN    haloperidol lactate (HALDOL) injection 5 mg  5 mg IntraMUSCular Q6H PRN    benztropine (COGENTIN) tablet 1 mg  1 mg Oral BID PRN    diphenhydrAMINE (BENADRYL) injection 50 mg  50 mg IntraMUSCular BID PRN    hydrOXYzine HCL (ATARAX) tablet 50 mg  50 mg Oral TID PRN    LORazepam (ATIVAN) injection 1 mg  1 mg IntraMUSCular Q4H PRN    acetaminophen (TYLENOL) tablet 650 mg  650 mg Oral Q4H PRN    magnesium hydroxide (MILK OF MAGNESIA) 400 mg/5 mL oral suspension 30 mL  30 mL Oral DAILY PRN      DISCUSSION:   the risks and benefits of the proposed medication  patient given opportunity to ask questions    Lab/Data Review: All lab results for the last 24 hours reviewed. No results found for this or any previous visit (from the past 24 hour(s)). Assessment:     Principal Problem:    Adjustment disorder with depressed mood (8/4/2021)    Active Problems:    Borderline personality disorder ([facility] [address] (8/4/2021)      PTSD (post-traumatic stress disorder) (8/4/2021)        Plan:     Continue current care  Collateral information  - CHANGE Doxepin to PRN  Needs to contact  for legal issues  Disposition planning with social work    I certify that this patient's inpatient psychiatric hospital services furnished since the previous certification were, and continue to be, required for treatment that could reasonably be expected to improve the patient's condition, or for diagnostic study, and that the patient continues to need, on a daily basis, active treatment furnished directly by or requiring the supervision of inpatient psychiatric facility personnel. In addition, the hospital records show that services furnished were intensive treatment services, admission or related services, or equivalent services.   Signed By: Lola Hill MD     August 6, 2021

## 2021-08-06 NOTE — BH NOTES
GROUP THERAPY PROGRESS NOTE    Patient did not participate in Discharge Planning Group.      Duy Almanzar LPC LSATP [facility]

## 2021-08-06 NOTE — GROUP NOTE
JOON  GROUP DOCUMENTATION INDIVIDUAL                                                                          Group Therapy Note    Date: 8/6/2021    Group Start Time: 1000  Group End Time: 1100  Group Topic: Topic Group    [facility] [place] 3 ACUTE BEHAV [facility]    Tevin Alvarado [address] GROUP    Group Therapy Note    Attendees: 8         Attendance: Did not attend    Patient's Goal:    Asif Gautam

## 2021-08-06 NOTE — PROGRESS NOTES
Problem: Suicide  Goal: *STG: Remains safe in hospital  Outcome: Progressing Towards Goal  Goal: *STG: Seeks staff when feelings of self harm or harm towards others arise  Outcome: Progressing Towards Goal  Goal: *STG/LTG: Complies with medication therapy  Outcome: Progressing Towards Goal  Goal: *LTG:  Identifies available community resources  Outcome: Progressing Towards Goal     0314-4313: Assumed care of patient after receiving shift report from outgoing nurse. Patient was out and visible on unit, interacting with peers and staff. Pt cooperative with vitals and assessment. Insight and Concentration present. Pt makes good eye contact. Affect was flat, mood was anxious. A&O x 4. Hygiene is adequate, independent in ADLs. Gait is steady. Sleep is . Appetite patterns WNL. Denies AVH/SI/HI/Anxiety/Depression. Last BM was today. Pt denies pain. Pt encouraged to continue to participate in care. Patient medication and diet compliant. Patient sociable and interacting appropriately with peers. Patient currently watching television at this time. 4675-3321: Patient transferred to general side of unit per order. No further issues noted at this time. 5887-3647: Patient appears to be asleep at this time. 0433-3226: Pt has been observed throughout the night. Total hours slept approximately 7.5. No s/s of distress noted. Patient has remained safe.

## 2021-08-06 NOTE — PROGRESS NOTES
Received verbal shift report from Richard Lind, [address] [facility]. Assumed care of pt. Received pt awake in bed, NAD. Denies si/hi/avh and pain. Mood is depressed with dull affect. Rates anxiety 7/10. Med and meal compliant. Did not attend groups. Interacting appropriately with peers and staff. Will continue to monitor    1600: pt watching tv in DR, talking with peer.  NAD      Problem: Depressed Mood (Adult/Pediatric)  Goal: *STG: Participates in treatment plan  Outcome: Progressing Towards Goal  Goal: *STG: Complies with medication therapy  Outcome: Progressing Towards Goal

## 2021-08-06 NOTE — GROUP NOTE
JOON  GROUP DOCUMENTATION INDIVIDUAL                                                                          Group Therapy Note    Date: 8/6/2021    Group Start Time: 1400  Group End Time: 1500  Group Topic: Recreational/Music Therapy    [facility] [place] [address] ACUTE BEHAV [facility]    [place], [address] [facility] GROUP DOCUMENTATION GROUP    Group Therapy Note    Attendees: 10         Attendance: Attended    Patient's Goal:  To concentrate on selected task    Interventions/techniques: Supported-crafts,games,music    Follows Directions:  Followed directions    Interactions: Interacted appropriately    Mental Status: Calm    Behavior/appearance: Attentive and Cooperative    Goals Achieved: Able to engage in interactions and Able to listen to others      Additional Notes:      Ottoniel Pierre

## 2021-08-06 NOTE — BH NOTES
Psychiatric Progress Note    Patient: Tamar Arana MRN: [id number]  SSN: [id number]    YOB: 2000  Age: 21 y.o. Sex: male      Admit Date: 8/3/2021    LOS: 2 days     Subjective:     Tamar Arana  reports feeling tired and irritable due to the noise level on this unit. Moods are fair. Denies SI/HI/AH/VH. No aggression or violence. Appropriately interactive and aware. Tolerating medications well except that he feels none of them. Eating ok and sleeping fairly at 7 hrs.     Objective:     Vitals:    08/03/21 2128 08/04/21 0725 08/04/21 2050 08/05/21 0800   BP: 121/70 108/74 137/81 111/75   Pulse: 84 69 86 81   Resp: 16 18 16 16   Temp: 98.2 °F (36.8 °C) 98.8 °F (37.1 °C) 98.3 °F (36.8 °C) 98.4 °F (36.9 °C)   SpO2: 96% 100% 100% 100%   Weight:       Height:            Mental Status Exam:   Sensorium  oriented to time, place and person   Relations cooperative   Eye Contact appropriate   Appearance:  age appropriate   Speech:  normal volume and non-pressured   Thought Process: goal directed   Thought Content free of delusions, free of hallucinations, and internally preoccupied    Suicidal ideations none   Mood:  depressed   Affect:  constricted   Memory   adequate   Concentration:  adequate   Insight:  fair   Judgment:  limited       MEDICATIONS:  Current Facility-Administered Medications   Medication Dose Route Frequency    doxepin (SINEquan) capsule 10 mg  10 mg Oral QHS    OLANZapine (ZyPREXA) tablet 5 mg  5 mg Oral Q6H PRN    haloperidol lactate (HALDOL) injection 5 mg  5 mg IntraMUSCular Q6H PRN    benztropine (COGENTIN) tablet 1 mg  1 mg Oral BID PRN    diphenhydrAMINE (BENADRYL) injection 50 mg  50 mg IntraMUSCular BID PRN    hydrOXYzine HCL (ATARAX) tablet 50 mg  50 mg Oral TID PRN    LORazepam (ATIVAN) injection 1 mg  1 mg IntraMUSCular Q4H PRN    acetaminophen (TYLENOL) tablet 650 mg  650 mg Oral Q4H PRN    magnesium hydroxide (MILK OF MAGNESIA) 400 mg/5 mL oral suspension 30 mL  30 mL Oral DAILY PRN      DISCUSSION:   the risks and benefits of the proposed medication  patient given opportunity to ask questions    Lab/Data Review: All lab results for the last 24 hours reviewed. No results found for this or any previous visit (from the past 24 hour(s)). Assessment:     Principal Problem:    Adjustment disorder with depressed mood (8/4/2021)    Active Problems:    Borderline personality disorder ([address] (8/4/2021)      PTSD (post-traumatic stress disorder) (8/4/2021)        Plan:     Continue current care  Collateral information  Needs to contact  for legal issues  Disposition planning with social work    I certify that this patient's inpatient psychiatric hospital services furnished since the previous certification were, and continue to be, required for treatment that could reasonably be expected to improve the patient's condition, or for diagnostic study, and that the patient continues to need, on a daily basis, active treatment furnished directly by or requiring the supervision of inpatient psychiatric facility personnel. In addition, the hospital records show that services furnished were intensive treatment services, admission or related services, or equivalent services.   Signed By: Jaycee Christopher MD     August 5, 2021

## 2021-08-06 NOTE — BH NOTES
Behavioral Health Treatment Team Note            Patient goal(s) for today: Take medications as prescribed. Treatment team focus/goals: Medication adjustments. Progress note Pt reports he remains anxious and overwhelmed. PT, SW and PO met to discussed effective communication and discharge planning.      LOS:  3                        Expected LOS: 8/11?     Financial concerns/prescription coverage:[employer] [facility] [place] - Voluntary  Date of last family contact: None                                  Family requesting physician contact today:  No  Discharge plan: TBD  Guns in the home: None reported. Outpatient provider(s):  To be linked     Participating treatment team members: Austen Moreira MSW and Dr. Cristina Rivas

## 2021-08-07 PROCEDURE — 99231 SBSQ HOSP IP/OBS SF/LOW 25: CPT | Performed by: PSYCHIATRY & NEUROLOGY

## 2021-08-07 PROCEDURE — 65220000003 HC RM SEMIPRIVATE PSYCH

## 2021-08-07 NOTE — BH NOTES
GROUP THERAPY PROGRESS NOTE    Patient is participating in Coping Skills group. Group time: 50 Minutes     Personal goal for participation: To assess current protective factors and discuss building adequate holistic protective factors to improve ability to cope with mental health challenges and symptoms. Goal orientation: Personal    Group therapy participation: passive    Therapeutic interventions reviewed and discussed:  Patients were provided psychoeducation on the importance of controlled and holistic protective factors. Patients completed a Protective Factors worksheet to assess how strong their controlled protective factors are in their social supports, physical health, self-esteem, coping skills, sense of purpose and healthy thinking. Patients discussed results of assessment and processed how protective factors have been valuable to them during mental health crisis. Patients also discussed which protective factors they would like to improve, how things may be different if they were able to improve these factors and identified specific action steps to help make this goal a reality. Impression of participation:  Pt presented with anxious mood, poor concentration, interacted minimally with others. Pt stated he is doing \"fine\" today, declined to elaborate. Pt completed drawing/coloring activity in dayroom and did not verbally participate in group.     Nakul Giang, MICHAEL

## 2021-08-07 NOTE — PROGRESS NOTES
0715: Bedside shift change report given to Ramón Sin RN (oncoming nurse) by ST HARLEY RN (offgoing nurse). Report included the following information SBAR, Kardex, Intake/Output, MAR, Accordion, Recent Results, and Med Rec Status. 9393: Assumed care of pt resting quietly in bed. AO x 4. Reports sore throat, but says this is normal for him in the morning and he just needs to something to drink. Denies anxiety, depression, SI and HI. States he is \"unsure\" if he is experiencing AVH as he \"cannot always trust my mental status or memories. \"  Pt is pleasant and cooperative, appropriate eye contact and interaction with staff. 3851-4858: Med/meal compliant. Ate breakfast in day room. Reports improved sore throat after eating/drinking. NAD, no behavioral concerns, no PRNs. Has been present on unit watching TV in day room. 5410-5172: Pt has been present on unit watching TV. Attended group. NAD, no behavioral concerns, no PRNs. 7296-3610: Pt has been resting quietly in bed. Ate lunch in day room. NAD, no behavioral concerns, no PRNs . 5614-0042: pt has been present on unit, watching TV. NAD, no behavioral concerns, no PRN. 3164-5740: No change to previous assessment. Pt appropriately interacting with peers/staff. NAD, no behavioral concerns, no PRNs. Problem: Anxiety  Goal: *Alleviation of anxiety  Outcome: Progressing Towards Goal     Problem: Depressed Mood (Adult/Pediatric)  Goal: *STG: Participates in treatment plan  Outcome: Progressing Towards Goal  Goal: *STG: Remains safe in hospital  Outcome: Progressing Towards Goal     Problem: Falls - Risk of  Goal: *Absence of Falls  Description: Document Geoff Fall Risk and appropriate interventions in the flowsheet.   Outcome: Progressing Towards Goal  Note: Fall Risk Interventions:            Medication Interventions: Teach patient to arise slowly

## 2021-08-07 NOTE — PROGRESS NOTES
Problem: Anxiety  Goal: *Alleviation of anxiety  Outcome: Progressing Towards Goal  Goal: *Alleviation of anxiety (Palliative Care)  Outcome: Progressing Towards Goal     Problem: Depressed Mood (Adult/Pediatric)  Goal: *STG: Participates in treatment plan  Outcome: Progressing Towards Goal  Goal: *STG: Participates in 1:1 therapy sessions  Outcome: Progressing Towards Goal  Goal: *STG: Verbalizes anger, guilt, and other feelings in a constructive manor  Outcome: Progressing Towards Goal  Goal: *STG: Attends activities and groups  Outcome: Progressing Towards Goal  Goal: *STG: Demonstrates reduction in symptoms and increase in insight into coping skills/future focused  Outcome: Progressing Towards Goal  Goal: *STG: Remains safe in hospital  Outcome: Progressing Towards Goal  Goal: *STG: Complies with medication therapy  Outcome: Progressing Towards Goal  Goal: *LTG: Returns to previous level of functioning and participates with after care plan  Outcome: Progressing Towards Goal  Goal: *LTG: Understands illness and can identify signs of relapse  Outcome: Progressing Towards Goal  Goal: Interventions  Outcome: Progressing Towards Goal    Patient alert and oriented X4. He has been out on the unit but withdrawn to himself. Patient denied depression, anxiety, SI/HI, AVH, and pain at this time. No ordered PM medications ordered. No PRNs given, vital signs stable and Q15 minute checks continue to maintain safety. Will continue to monitor.

## 2021-08-08 PROCEDURE — 99231 SBSQ HOSP IP/OBS SF/LOW 25: CPT | Performed by: PSYCHIATRY & NEUROLOGY

## 2021-08-08 PROCEDURE — 90785 PSYTX COMPLEX INTERACTIVE: CPT | Performed by: PSYCHIATRY & NEUROLOGY

## 2021-08-08 PROCEDURE — 65220000003 HC RM SEMIPRIVATE PSYCH

## 2021-08-08 PROCEDURE — 90833 PSYTX W PT W E/M 30 MIN: CPT | Performed by: PSYCHIATRY & NEUROLOGY

## 2021-08-08 NOTE — PROGRESS NOTES
Report received from Paulie, [address]. Pt received out on unit, cooperative and isolative to self. Reports anxiety at 8 and depression at 6/7 due to \"feeling off this morning. \" denies all SI/HI and AVH. Meal compliant. Voices no other concerns at this time. End of shift report given to Paulie, RN. Problem: Depressed Mood (Adult/Pediatric)  Goal: *STG: Participates in treatment plan  Outcome: Progressing Towards Goal  Goal: *STG: Remains safe in hospital  Outcome: Progressing Towards Goal  Goal: *STG: Complies with medication therapy  Outcome: Progressing Towards Goal     Problem: Falls - Risk of  Goal: *Absence of Falls  Description: Document Geoff Fall Risk and appropriate interventions in the flowsheet.   Outcome: Progressing Towards Goal  Note: Fall Risk Interventions:            Medication Interventions: Teach patient to arise slowly

## 2021-08-08 NOTE — PROGRESS NOTES
Problem: Anxiety  Goal: *Alleviation of anxiety  Outcome: Progressing Towards Goal     Problem: Depressed Mood (Adult/Pediatric)  Goal: *STG: Remains safe in hospital  Outcome: Progressing Towards Goal

## 2021-08-08 NOTE — BH NOTES
GROUP THERAPY PROGRESS NOTE    Patient is participating in Coping Skills group. Group time: 60 minutes    Personal goal for participation: To learn thought stopping coping skill to combat anxiety, negative thoughts and ruminating thoughts    Goal orientation: Personal    Group therapy participation: active    Therapeutic interventions reviewed and discussed: Patients were provided psychoeducation on rumination and the impact of negative thoughts on mood and behavior. Patients processed and discussed personal negative/ruminating thoughts. Patients engaged in thought stopping activity as a coping skill to combat anxiety, negative thoughts and ruminating thoughtsPatients discussed the benefits of using thought stopping as a coping skill and how to incorporate it into everyday life. Impression of participation:  Pt presented with anxious mood, poor concentration at times, interacted minimally with others, constricted affect. Pt processed how negative thoughts have impacted his mood and desire. Pt processed the importance of discipline and will-power to change thought process. Pt engaged in thought stopping exercise, expressed desire to incorporate this into daily life.      MICHAEL Liz

## 2021-08-08 NOTE — BH NOTES
1930  Assumed care of patient from day shift nurse. Patient in his room sitting on the bed. 2000  Patient has been isolative. Rates his current anxiety 6/10 and depression 7/10. When asked about auditory hallucinations patient reports <<I don't know. I don't trust myself to be able to answer that. I think it's my paranoia.>>    2030  Patient at his snack in his room. 2100 No PRNs requested. 0100  Patient sleeping.    0600  No issues to note this shift. Patient slept for 7 hours this shift. Will continue to monitor for safety, location and behavior [id number].

## 2021-08-09 PROCEDURE — 99231 SBSQ HOSP IP/OBS SF/LOW 25: CPT | Performed by: PSYCHIATRY & NEUROLOGY

## 2021-08-09 PROCEDURE — 65220000003 HC RM SEMIPRIVATE PSYCH

## 2021-08-09 NOTE — BH NOTES
Behavioral Health Treatment Team Note         Patient goal(s) for today: Take medications as prescribed.   Treatment team focus/goals: Medication adjustments.   Progress note: \"I feel blaze. \"  Pt reports he remains anxious and overwhelmed. Pt reports he's sleeping poorly. Pt was encouraged to call his therapist Ms. Disla to schedule a visit.  Pt's outpatient team is working on a dispo placement option.      LOS:  6                        Expected LOS: 8/11?     Financial concerns/prescription coverage:[facility] Medicaid - Voluntary  Date of last family contact: None                                  Family requesting physician contact today:  No  Discharge plan: TBD  Guns in the home: None reported.                                                       Outpatient provider(s): To be linked     Participating treatment team members: MICHAEL Boles and Dr. Denita An

## 2021-08-09 NOTE — PROGRESS NOTES
0800 Pt is isolative to their room but did come out to eat breakfast.     1000 Pt has laid down to take a nap after eating. Pt offers little at this time. Anxiety was a 7 and depression 5. Pt has no scheduled medications for this morning and did not require prn medicine. Denies SI, HI, AH and VH. Will continue to monitor.

## 2021-08-09 NOTE — BH NOTES
PSYCHOTHERAPY SESSION NOTE     Patient Name  Varun Rivas   Date of Birth 2000   [facility] [id number]   Medical Record Number  [id number]      Age  21 y.o. PCP None   Admit date:  8/3/2021    Room Number  323/02  @ [facility]   Date of Service  8/8/2021            Length of psychotherapy session: 30 minutes    Main condition/diagnosis/issues treated during session today, 8/8/2021 : adjustment disorder with depressed mood     I employed Cognitive Behavioral therapy techniques, Reality-Oriented psychotherapy, as well as supportive psychotherapy in regards to various ongoing psychosocial stressors, including the following: pre-admission and current problems; housing issues; occupational issues; academic issues; legal issues; and stress of hospitalization. Interpersonal relationship issues and psychodynamic conflicts explored. Attempts made to alleviate maladaptive patterns. Session focused on the patient's life in the community and the limitations of his specific legal issues. Overall, patient is progressing. Treatment Plan Update (reviewed and updated 8/8/2021) : I will modify psychotherapy tx plan by implementing more stress management strategies, building upon cognitive behavioral techniques, increasing coping skills, as well as shoring up psychological defenses). An extended energy and skill set was needed to engage pt in psychotherapy due to some of the following: resistiveness, complexity, negativity, confrontational nature, hostile behaviors, and/or severe abnormalities in thought processes/psychosis resulting in the loss of expressive/receptive language communication skills.

## 2021-08-09 NOTE — BH NOTES
GROUP THERAPY PROGRESS NOTE    Patient did not participate in a Process group.      Deep Walker, MSW

## 2021-08-09 NOTE — BH NOTES
Psychiatric Progress Note    Patient: Rosita Toribio MRN: [id number]  SSN: [id number]    YOB: 2000  Age: 21 y.o. Sex: male      Admit Date: 8/3/2021    LOS: 6 days     Subjective:     Rosita Toribio  reports feeling tired and irritable due to the noise level on this unit. Moods are fair. Denies SI/HI/AH/VH. No aggression or violence. Appropriately interactive and aware. Tolerating medications well except that he feels none of them. Eating ok and sleeping fairly at 7 hrs.    8/06 - overnight, patient has been visible, he slept 7 hours and denies any depressed mood this morning. He is withdrawn per nursing, and reports anxiety at a '7/10' in intensity. Patient reports feeling confused and disoriented thought he is calm and pleasant on interview. He reports the Doxepin contributed to this, stating it is too intense of a medication. Patient discharge focused, asking what his options were upon release. He denies active thoughts of self harm. 8/07 - no acute overnight events. Patient slept 6 hours, got no PRNs; he remains isolative but is in the day room for meals. He denies SI/HI/AVH/PI. No self harming behaviors elicited. Patient eager for discharge, still working with SW to secure a safe dispo plan. He continues to resist taking any medications, stating that 'nothing works.' Patient declines a new agent for insomnia stating that he has been dealing with this issue for years with no improvement. 8/08 - patient visible, restless, slept 7 hours overnight. Patient endorses depression but denies active thoughts of self harm; he states he is feeling 'off' this morning. Patient calm and cooperative with assessments, he continues to decline any medication for mood or insomnia. He is discharge focused, able to make needs known. 8/09 - patient slept 5 hours, he states that he is sleeping poorly overnight.  His mood is 'blaze.' Per nurse assessment, patient reports depression and anxiety. He is concerned about his experiences on the unit and continues to be in fair behavioral control. Patient denies SI/HI/AVH/PI. He is able to make his needs known, and continues to     Objective:     Vitals:    08/07/21 0739 08/2000 08/08/21 0816 08/08/21 2029   BP: 115/84 133/80 122/77 136/74   Pulse: 69 89 80 98   Resp: 18 18 14 14   Temp: 98.6 °F (37 °C) 98.6 °F (37 °C) 97.8 °F (36.6 °C) 98.6 °F (37 °C)   SpO2: 99% 99% 98% 97%   Weight:       Height:            Mental Status Exam:   Sensorium  oriented to time, place and person   Relations cooperative   Eye Contact appropriate   Appearance:  age appropriate   Speech:  normal volume and non-pressured   Thought Process: goal directed   Thought Content free of delusions, free of hallucinations, and internally preoccupied    Suicidal ideations none   Mood:  depressed   Affect:  constricted   Memory   adequate   Concentration:  adequate   Insight:  fair   Judgment:  limited       MEDICATIONS:  Current Facility-Administered Medications   Medication Dose Route Frequency    doxepin (SINEquan) capsule 10 mg  10 mg Oral QHS PRN    OLANZapine (ZyPREXA) tablet 5 mg  5 mg Oral Q6H PRN    haloperidol lactate (HALDOL) injection 5 mg  5 mg IntraMUSCular Q6H PRN    benztropine (COGENTIN) tablet 1 mg  1 mg Oral BID PRN    diphenhydrAMINE (BENADRYL) injection 50 mg  50 mg IntraMUSCular BID PRN    hydrOXYzine HCL (ATARAX) tablet 50 mg  50 mg Oral TID PRN    LORazepam (ATIVAN) injection 1 mg  1 mg IntraMUSCular Q4H PRN    acetaminophen (TYLENOL) tablet 650 mg  650 mg Oral Q4H PRN    magnesium hydroxide (MILK OF MAGNESIA) 400 mg/5 mL oral suspension 30 mL  30 mL Oral DAILY PRN      DISCUSSION:   the risks and benefits of the proposed medication  patient given opportunity to ask questions    Lab/Data Review: All lab results for the last 24 hours reviewed. No results found for this or any previous visit (from the past 24 hour(s)).       Assessment: Principal Problem:    Adjustment disorder with depressed mood (8/4/2021)    Active Problems:    Borderline personality disorder ([address] (8/4/2021)      PTSD (post-traumatic stress disorder) (8/4/2021)        Plan:     Continue current care  Collateral information  - CHANGE Doxepin to PRN  Needs to contact  for legal issues  Disposition planning with social work    I certify that this patient's inpatient psychiatric hospital services furnished since the previous certification were, and continue to be, required for treatment that could reasonably be expected to improve the patient's condition, or for diagnostic study, and that the patient continues to need, on a daily basis, active treatment furnished directly by or requiring the supervision of inpatient psychiatric facility personnel. In addition, the hospital records show that services furnished were intensive treatment services, admission or related services, or equivalent services.   Signed By: Naun Zamora MD     August 9, 2021

## 2021-08-09 NOTE — BH NOTES
GROUP THERAPY PROGRESS NOTE    Patient is participating in Coping Skills group. Group time: 60 minutes    Personal goal for participation: To gain psychoeducation on cognitive distortions based on Cognitive Behavioral Therapy models. To process how behavior is influenced by feelings, thoughts and situations. Goal orientation: Personal    Group therapy participation: passive    Therapeutic interventions reviewed and discussed: SW provided patients with psychoeducation on cognitive distortions based on Cognitive Behavioral Therapy models. Patients processed which cognitive distortions they experience, and how these irrational thoughts impact their emotions and behavior. Patients then completed a worksheet called The Cognitive Model to identify how their thoughts impact their emotions and behaviors in triggering situations. Patients then had the opportunity to share their self-discoveries from the worksheet with the group. Impression of participation: Pt presented with anxious mood, constricted affect, guarded, interacted minimally with others. Pt colored throughout group session, declined to verbally participate. Pt left dayroom early.     MICHAEL Daniels

## 2021-08-09 NOTE — PROGRESS NOTES
Problem: Anxiety  Goal: *Alleviation of anxiety  Outcome: Progressing Towards Goal  Goal: *Alleviation of anxiety (Palliative Care)  Outcome: Progressing Towards Goal     Problem: Depressed Mood (Adult/Pediatric)  Goal: *STG: Participates in treatment plan  Outcome: Progressing Towards Goal

## 2021-08-09 NOTE — GROUP NOTE
JOON  GROUP DOCUMENTATION INDIVIDUAL                                                                          Group Therapy Note    Date: 8/9/2021    Group Start Time: 1400  Group End Time: 1500  Group Topic: Recreational/Music Therapy    [facility] [place] [address] ACUTE BEHAV [facility]    [place], [address] [facility] GROUP DOCUMENTATION GROUP    Group Therapy Note    Attendees: 8         Attendance: Attended    Patient's Goal:  To concentrate on selected task    Interventions/techniques: Supported-crafts,games,music    Follows Directions:  Followed directions    Interactions: Interacted appropriately    Mental Status: Calm    Behavior/appearance: Attentive, Cooperative and Needed prompting    Goals Achieved: Able to engage in interactions and Able to listen to others      Additional Notes:  Pleasant-socialized with peers    Ottoniel Pierre

## 2021-08-09 NOTE — BH NOTES
Psychiatric Progress Note    Patient: Elizabeth Jeffries MRN: [id number]  SSN: [id number]    YOB: 2000  Age: 21 y.o. Sex: male      Admit Date: 8/3/2021    LOS: 5 days     Subjective:     Elizabeth Jeffries  reports feeling tired and irritable due to the noise level on this unit. Moods are fair. Denies SI/HI/AH/VH. No aggression or violence. Appropriately interactive and aware. Tolerating medications well except that he feels none of them. Eating ok and sleeping fairly at 7 hrs.    8/06 - overnight, patient has been visible, he slept 7 hours and denies any depressed mood this morning. He is withdrawn per nursing, and reports anxiety at a '7/10' in intensity. Patient reports feeling confused and disoriented thought he is calm and pleasant on interview. He reports the Doxepin contributed to this, stating it is too intense of a medication. Patient discharge focused, asking what his options were upon release. He denies active thoughts of self harm. 8/07 - no acute overnight events. Patient slept 6 hours, got no PRNs; he remains isolative but is in the day room for meals. He denies SI/HI/AVH/PI. No self harming behaviors elicited. Patient eager for discharge, still working with SW to secure a safe dispo plan. He continues to resist taking any medications, stating that 'nothing works.' Patient declines a new agent for insomnia stating that he has been dealing with this issue for years with no improvement. 8/08 - patient visible, restless, slept 7 hours overnight. Patient endorses depression but denies active thoughts of self harm; he states he is feeling 'off' this morning. Patient calm and cooperative with assessments, he continues to decline any medication for mood or insomnia. He is discharge focused, able to make needs known.     Objective:     Vitals:    08/07/21 0739 08/2000 08/08/21 0816 08/08/21 2029   BP: 115/84 133/80 122/77 136/74   Pulse: 69 89 80 98   Resp: 18 18 14 14   Temp: 98.6 °F (37 °C) 98.6 °F (37 °C) 97.8 °F (36.6 °C) 98.6 °F (37 °C)   SpO2: 99% 99% 98% 97%   Weight:       Height:            Mental Status Exam:   Sensorium  oriented to time, place and person   Relations cooperative   Eye Contact appropriate   Appearance:  age appropriate   Speech:  normal volume and non-pressured   Thought Process: goal directed   Thought Content free of delusions, free of hallucinations, and internally preoccupied    Suicidal ideations none   Mood:  depressed   Affect:  constricted   Memory   adequate   Concentration:  adequate   Insight:  fair   Judgment:  limited       MEDICATIONS:  Current Facility-Administered Medications   Medication Dose Route Frequency    doxepin (SINEquan) capsule 10 mg  10 mg Oral QHS PRN    OLANZapine (ZyPREXA) tablet 5 mg  5 mg Oral Q6H PRN    haloperidol lactate (HALDOL) injection 5 mg  5 mg IntraMUSCular Q6H PRN    benztropine (COGENTIN) tablet 1 mg  1 mg Oral BID PRN    diphenhydrAMINE (BENADRYL) injection 50 mg  50 mg IntraMUSCular BID PRN    hydrOXYzine HCL (ATARAX) tablet 50 mg  50 mg Oral TID PRN    LORazepam (ATIVAN) injection 1 mg  1 mg IntraMUSCular Q4H PRN    acetaminophen (TYLENOL) tablet 650 mg  650 mg Oral Q4H PRN    magnesium hydroxide (MILK OF MAGNESIA) 400 mg/5 mL oral suspension 30 mL  30 mL Oral DAILY PRN      DISCUSSION:   the risks and benefits of the proposed medication  patient given opportunity to ask questions    Lab/Data Review: All lab results for the last 24 hours reviewed. No results found for this or any previous visit (from the past 24 hour(s)).       Assessment:     Principal Problem:    Adjustment disorder with depressed mood (8/4/2021)    Active Problems:    Borderline personality disorder ([facility] [address] (8/4/2021)      PTSD (post-traumatic stress disorder) (8/4/2021)        Plan:     Continue current care  Collateral information  - CHANGE Doxepin to PRN  Needs to contact  for legal issues  Disposition planning with social work    I certify that this patient's inpatient psychiatric hospital services furnished since the previous certification were, and continue to be, required for treatment that could reasonably be expected to improve the patient's condition, or for diagnostic study, and that the patient continues to need, on a daily basis, active treatment furnished directly by or requiring the supervision of inpatient psychiatric facility personnel. In addition, the hospital records show that services furnished were intensive treatment services, admission or related services, or equivalent services.   Signed By: Tanner Barboza MD     August 8, 2021

## 2021-08-09 NOTE — BH NOTES
GROUP THERAPY PROGRESS NOTE    Patient did not participate in Discharge Planning Group.      MICHAEL Cross

## 2021-08-09 NOTE — GROUP NOTE
JOON  GROUP DOCUMENTATION INDIVIDUAL                                                                          Group Therapy Note    Date: 8/9/2021    Group Start Time: 1000  Group End Time: 1100  Group Topic: Topic Group    [address] [facility] [address] ACUTE [address] [facility], [address] [facility] GROUP DOCUMENTATION GROUP    Group Therapy Note    Attendees: 10         Attendance: Attended    Patient's Goal:  To identify positive coping strategies a-z    Interventions/techniques: Supported-game    Follows Directions: Followed directions    Interactions: Interacted appropriately    Mental Status: Calm    Behavior/appearance: Attentive, Cooperative and Needed prompting    Goals Achieved: Able to engage in interactions and Able to listen to others      Additional Notes:  Pt arrived late.     Shanon Montero

## 2021-08-10 PROCEDURE — 65220000003 HC RM SEMIPRIVATE PSYCH

## 2021-08-10 PROCEDURE — 99231 SBSQ HOSP IP/OBS SF/LOW 25: CPT | Performed by: PSYCHIATRY & NEUROLOGY

## 2021-08-10 NOTE — GROUP NOTE
JOON  GROUP DOCUMENTATION INDIVIDUAL                                                                          Group Therapy Note    Date: 8/10/2021    Group Start Time: 1000  Group End Time: 1100  Group Topic: Topic Group    [facility] [place] [address] ACUTE BEHAV [facility]    [place], [address] [address] GROUP    Group Therapy Note    Attendees: 8         Attendance: Did not attend    Patient's Goal:      Interventions/techniques:Zaria Maldonado

## 2021-08-10 NOTE — BH NOTES
GROUP THERAPY PROGRESS NOTE    Patient did not participate in Coping Skills group.      MICHAEL Valencia

## 2021-08-10 NOTE — BH NOTES
Psychiatric Progress Note    Patient: Valentina Haile MRN: [id number]  SSN: [id number]    YOB: 2000  Age: 21 y.o. Sex: male      Admit Date: 8/3/2021    LOS: 7 days     Subjective:     Valentina Haile  reports feeling tired and irritable due to the noise level on this unit. Moods are fair. Denies SI/HI/AH/VH. No aggression or violence. Appropriately interactive and aware. Tolerating medications well except that he feels none of them. Eating ok and sleeping fairly at 7 hrs.    8/06 - overnight, patient has been visible, he slept 7 hours and denies any depressed mood this morning. He is withdrawn per nursing, and reports anxiety at a '7/10' in intensity. Patient reports feeling confused and disoriented thought he is calm and pleasant on interview. He reports the Doxepin contributed to this, stating it is too intense of a medication. Patient discharge focused, asking what his options were upon release. He denies active thoughts of self harm. 8/07 - no acute overnight events. Patient slept 6 hours, got no PRNs; he remains isolative but is in the day room for meals. He denies SI/HI/AVH/PI. No self harming behaviors elicited. Patient eager for discharge, still working with SW to secure a safe dispo plan. He continues to resist taking any medications, stating that 'nothing works.' Patient declines a new agent for insomnia stating that he has been dealing with this issue for years with no improvement. 8/08 - patient visible, restless, slept 7 hours overnight. Patient endorses depression but denies active thoughts of self harm; he states he is feeling 'off' this morning. Patient calm and cooperative with assessments, he continues to decline any medication for mood or insomnia. He is discharge focused, able to make needs known. 8/09 - patient slept 5 hours, he states that he is sleeping poorly overnight.  His mood is 'blaze.' Per nurse assessment, patient reports depression and anxiety. He is concerned about his experiences on the unit and continues to be in fair behavioral control. Patient denies SI/HI/AVH/PI. He is able to make his needs known, and continues to improve. 8/10 - no acute overnight events. Patient visible, cooperative and pleasant. He denies active thoughts of self harm and is discharge focused. Patient offered and declines medication for symptomatic depression. He is hoping to be discharged to a CSU, [facility].     Objective:     Vitals:    08/08/21 2029 08/09/21 0923 08/2000 08/10/21 0815   BP: 136/74 132/70 129/84 122/75   Pulse: 98 92 (!) 114 83   Resp: 14 16 16 16   Temp: 98.6 °F (37 °C) 99 °F (37.2 °C) 98.7 °F (37.1 °C) 98.7 °F (37.1 °C)   SpO2: 97% 99% 98% 98%   Weight:       Height:            Mental Status Exam:   Sensorium  oriented to time, place and person   Relations cooperative   Eye Contact appropriate   Appearance:  age appropriate   Speech:  normal volume and non-pressured   Thought Process: goal directed   Thought Content free of delusions, free of hallucinations, and internally preoccupied    Suicidal ideations none   Mood:  depressed   Affect:  constricted   Memory   adequate   Concentration:  adequate   Insight:  fair   Judgment:  limited       MEDICATIONS:  Current Facility-Administered Medications   Medication Dose Route Frequency    doxepin (SINEquan) capsule 10 mg  10 mg Oral QHS PRN    OLANZapine (ZyPREXA) tablet 5 mg  5 mg Oral Q6H PRN    haloperidol lactate (HALDOL) injection 5 mg  5 mg IntraMUSCular Q6H PRN    benztropine (COGENTIN) tablet 1 mg  1 mg Oral BID PRN    diphenhydrAMINE (BENADRYL) injection 50 mg  50 mg IntraMUSCular BID PRN    hydrOXYzine HCL (ATARAX) tablet 50 mg  50 mg Oral TID PRN    LORazepam (ATIVAN) 2 mg/mL injection 1 mg  1 mg IntraMUSCular Q4H PRN    acetaminophen (TYLENOL) tablet 650 mg  650 mg Oral Q4H PRN    magnesium hydroxide (MILK OF MAGNESIA) 400 mg/5 mL oral suspension 30 mL  30 mL Oral DAILY PRN      DISCUSSION:   the risks and benefits of the proposed medication  patient given opportunity to ask questions    Lab/Data Review: All lab results for the last 24 hours reviewed. No results found for this or any previous visit (from the past 24 hour(s)). Assessment:     Principal Problem:    Adjustment disorder with depressed mood (8/4/2021)    Active Problems:    Borderline personality disorder ([address] (8/4/2021)      PTSD (post-traumatic stress disorder) (8/4/2021)        Plan:     Continue current care  Collateral information  Needs to contact  for legal issues  Disposition planning with social work    I certify that this patient's inpatient psychiatric hospital services furnished since the previous certification were, and continue to be, required for treatment that could reasonably be expected to improve the patient's condition, or for diagnostic study, and that the patient continues to need, on a daily basis, active treatment furnished directly by or requiring the supervision of inpatient psychiatric facility personnel. In addition, the hospital records show that services furnished were intensive treatment services, admission or related services, or equivalent services.   Signed By: Nickolas Pina MD     August 10, 2021

## 2021-08-10 NOTE — PROGRESS NOTES
Received pt up in room, smiling. NAD. Reports depression 7/10, anxiety 6/10. Denies si/hi/avh and pain. States his mood and sleep are \"blaze. \" Attending groups and interacting w/peers. Med and meal compliant. Will continue to monitor. Problem: Depressed Mood (Adult/Pediatric)  Goal: *STG: Verbalizes anger, guilt, and other feelings in a constructive manor  Outcome: Progressing Towards Goal     Problem: Falls - Risk of  Goal: *Absence of Falls  Description: Document Geoff Fall Risk and appropriate interventions in the flowsheet.   Outcome: Progressing Towards Goal  Note: Fall Risk Interventions:   Medication Interventions: Teach patient to arise slowly

## 2021-08-10 NOTE — GROUP NOTE
JOON  GROUP DOCUMENTATION INDIVIDUAL                                                                          Group Therapy Note    Date: 8/10/2021    Group Start Time: 1400  Group End Time: 1500  Group Topic: Recreational/Music Therapy    [facility] - [place] [address] ACUTE BEHAV [facility]    Tevin Alvarado [facility] GROUP    Group Therapy Note    Attendees: 9         Attendance: Did not attend    Patient's Goal:      Interventions/techniques:Zaria Maldonado

## 2021-08-10 NOTE — BH NOTES
Behavioral Health Treatment Team Note         Patient goal(s) for today: Take medications as prescribed.   Treatment team focus/goals: Medication adjustments.   Progress note: \"I feel blaze. \" Pt reports he remains anxious and is having difficulty sleeping.  SW spoke with pt's Epifanio NATHONY team and paperwork for sent to [facility].       LOS:  7                        Expected LOS: 8/11?     Financial concerns/prescription coverage: [employer] [place] - Voluntary  Date of last family contact: None                                  Family requesting physician contact today:  No  Discharge plan: TBD  Guns in the home: None reported.                                                       Outpatient provider(s): To be linked     Participating treatment team members: MICHAEL Busch and Dr. Franco Deleon

## 2021-08-10 NOTE — BH NOTES
GROUP THERAPY PROGRESS NOTE    Patient is participating in coping skills group. Group time: 45 minutes    Personal goal for participation: Learn coping skills to reduce negative emotions    Goal orientation: Personal    Group therapy participation: minimal    Therapeutic interventions reviewed and discussed: Group discussion on why being bored can be a problem and the consequences of boredom that patient have experienced. Patient discussed issues they have had with being bored and ways they have tried to combat boredom. This lead to discussion of coping skills for negative emotions and ways to feel better that each patient has tried or that they have heard of. Discussion of activities that help with boredom, challenges, potential solutions and plans. Impression of participation: Rod Shahlas was present in group but did not share with group. He was attentive but did not speak. When he did interact with others it was in private discussions and not with the whole group.     Franklyn Hall LPC LSATP [facility]

## 2021-08-10 NOTE — PROGRESS NOTES
RD screened per LOS policy. No acute nutrition issues identified. Pt meal compliant; double portions ordered to help meet ~ needs at [facility]W. Elev triglycerides and BMI noted.   Ht: 5'9\"  Wt: 237 lb  BMI: 35.00 kg/(m^2) c/w obesity class II  Est energy needs: 2155 kcal, 72 g protein, 2500 mL fluids  Pt will consume > 75% of meals at follow up 7-10 days  LOS

## 2021-08-10 NOTE — PROGRESS NOTES
Behavioral Services                                              Medicare Re-Certification    I certify that the inpatient psychiatric hospital services furnished since the previous certification/re-certification were, and continue to be, medically necessary for;    [x] (1) Treatment which could reasonably be expected to improve the patient's condition,    [x] (2) Or for diagnostic study. Estimated length of stay/service 2-3 days    Plan for post-hospital care CSU vs group home    This patient continues to need, on a daily basis, active treatment furnished directly by or requiring the supervision of inpatient psychiatric personnel.     Electronically signed by Rupinder Henriquez MD on 8/10/2021 at 2:22 PM

## 2021-08-10 NOTE — SUICIDE SAFETY PLAN
SAFETY PLAN    A suicide Safety Plan is a document that supports someone when they are having thoughts of suicide. Warning Signs that indicate a suicidal crisis may be developing: What (situations, thoughts, feelings, body sensations, behaviors, etc.) do you experience that lets you know you are beginning to think about suicide? 1. Hopelessness  2. Lonely  3. When I feel uncared for    Internal Coping Strategies:  What things can I do (relaxation techniques, hobbies, physical activities, etc.) to take my mind off my problems without contacting another person? 1. Video games  2. Writing  3. Looking at [employer] and social settings that provide distraction: Who can I call or where can I go to distract me? 1. Name:   Phone:   2. Name:   Phone:   3. Place: [place]/game store           4. Place: Library     People whom I can ask for help: Who can I call when I need help - for example, friends, family, clergy, someone else? 1. Name: Trever Lutz             Phone: [phone]  2. Name: Ms. Natalie Bautista  Phone: [phone]  3. Name: Ms. Brian Alarcon  Phone: [phone]    Professionals or [facility] agencies I can contact during a crisis: Who can I call for help - for example, my doctor, my psychiatrist, my psychologist, a mental health provider, a suicide hotline? 1. Clinician Name: Josemanuel Bennett  Phone: [phone]      Clinician Pager or Emergency Contact #: [phone]    8. Clinician Name:   Phone:      Clinician Pager or Emergency Contact #:     3. Suicide Prevention Lifeline: 9-352-286-TALK (8277)    4. [address] [place] [address] Emergency Services -  for example, [facility] suicide hotline, [facility] Hotline: Josemanuel Bennett      Emergency Services Address: [address], [address] [facility]      Emergency Services Phone: [phone]    Making the environment safe: How can I make my environment (house/apartment/living space) safer?  For example, can I remove guns, medications, and other items? 1. Have stuff to keep me busy and have a way to contact others  2.  No access to guns or weapons

## 2021-08-10 NOTE — BH NOTES
The patient has been visible on the unit. Socializing with select peers and staff. Patient observed smiling inappropriately. Rates his current anxiety 4/10 denies depression SI/HI/AVH. No PRNs requested. Will continue to monitor for safety, location, and behavior [id number]. Nursing rounds completed. No issues to note. Patient slept for 5 hours this shift. Will continue to monitor.

## 2021-08-11 PROCEDURE — 65220000003 HC RM SEMIPRIVATE PSYCH

## 2021-08-11 PROCEDURE — 99232 SBSQ HOSP IP/OBS MODERATE 35: CPT | Performed by: PSYCHIATRY & NEUROLOGY

## 2021-08-11 RX ORDER — MIRTAZAPINE 15 MG/1
7.5 TABLET, FILM COATED ORAL
Status: DISCONTINUED | OUTPATIENT
Start: 2021-08-11 | End: 2021-08-12

## 2021-08-11 NOTE — GROUP NOTE
JOON  GROUP DOCUMENTATION INDIVIDUAL                                                                          Group Therapy Note    Date: 8/11/2021    Group Start Time: 1100  Group End Time: 1200  Group Topic: Topic Group    [facility] [place] [address] ACUTE BEHAV [facility]    [place], [address] [facility] GROUP DOCUMENTATION GROUP    Group Therapy Note    Attendees: 9         Attendance: Attended    Patient's Goal:  To participate in relaxation activity    Interventions/techniques: Supported-music    Follows Directions:  Followed directions    Interactions: Interacted appropriately    Mental Status: Calm and Flat    Behavior/appearance: Cooperative and Needed prompting    Goals Achieved: Able to engage in interactions and Able to listen to others          Jc Castillo

## 2021-08-11 NOTE — PROGRESS NOTES
The documentation for this period is being entered following the guidelines as defined in the [facility] downtime policy by Joby Trinh. Q 15 min checks maintained during this period. Dana Dyer

## 2021-08-11 NOTE — BH NOTES
Psychiatric Progress Note    Patient: Varun Rivas MRN: [id number]  SSN: [id number]    YOB: 2000  Age: 21 y.o. Sex: male      Admit Date: 8/3/2021    LOS: 8 days     Subjective:     Varun Rivas  reports feeling tired and irritable due to the noise level on this unit. Moods are fair. Denies SI/HI/AH/VH. No aggression or violence. Appropriately interactive and aware. Tolerating medications well except that he feels none of them. Eating ok and sleeping fairly at 7 hrs.    8/06 - overnight, patient has been visible, he slept 7 hours and denies any depressed mood this morning. He is withdrawn per nursing, and reports anxiety at a '7/10' in intensity. Patient reports feeling confused and disoriented thought he is calm and pleasant on interview. He reports the Doxepin contributed to this, stating it is too intense of a medication. Patient discharge focused, asking what his options were upon release. He denies active thoughts of self harm. 8/07 - no acute overnight events. Patient slept 6 hours, got no PRNs; he remains isolative but is in the day room for meals. He denies SI/HI/AVH/PI. No self harming behaviors elicited. Patient eager for discharge, still working with SW to secure a safe dispo plan. He continues to resist taking any medications, stating that 'nothing works.' Patient declines a new agent for insomnia stating that he has been dealing with this issue for years with no improvement. 8/08 - patient visible, restless, slept 7 hours overnight. Patient endorses depression but denies active thoughts of self harm; he states he is feeling 'off' this morning. Patient calm and cooperative with assessments, he continues to decline any medication for mood or insomnia. He is discharge focused, able to make needs known. 8/09 - patient slept 5 hours, he states that he is sleeping poorly overnight.  His mood is 'blaze.' Per nurse assessment, patient reports depression and anxiety. He is concerned about his experiences on the unit and continues to be in fair behavioral control. Patient denies SI/HI/AVH/PI. He is able to make his needs known, and continues to improve. 8/10 - no acute overnight events. Patient visible, cooperative and pleasant. He denies active thoughts of self harm and is discharge focused. Patient offered and declines medication for symptomatic depression. He is hoping to be discharged to a CSU, RASHAWN coordinating.    8/11 - patient slept 5 hours overnight. He continues to report ongoing depression, is reluctant to start medication. Dicussed an agent for sleep and depression, patient is ambivalent but voices understanding that it will be offered to him.  Patient manipulative per nursing staff; RASHAWN working in disposition to [place] [address] followed by [place] per outpatient team.     Objective:     Vitals:    08/2000 08/10/21 0815 08/10/21 2032 08/11/21 0758   BP: 129/84 122/75 136/77 133/80   Pulse: (!) 114 83 87 77   Resp: 16 16 18 18   Temp: 98.7 °F (37.1 °C) 98.7 °F (37.1 °C) 98.8 °F (37.1 °C) 97.9 °F (36.6 °C)   SpO2: 98% 98% 98% 99%   Weight:       Height:            Mental Status Exam:   Sensorium  oriented to time, place and person   Relations cooperative   Eye Contact appropriate   Appearance:  age appropriate   Speech:  normal volume and non-pressured   Thought Process: goal directed   Thought Content free of delusions, free of hallucinations, and internally preoccupied    Suicidal ideations none   Mood:  depressed   Affect:  constricted   Memory   adequate   Concentration:  adequate   Insight:  fair   Judgment:  limited       MEDICATIONS:  Current Facility-Administered Medications   Medication Dose Route Frequency    doxepin (SINEquan) capsule 10 mg  10 mg Oral QHS PRN    OLANZapine (ZyPREXA) tablet 5 mg  5 mg Oral Q6H PRN    haloperidol lactate (HALDOL) injection 5 mg  5 mg IntraMUSCular Q6H PRN    benztropine (COGENTIN) tablet 1 mg  1 mg Oral BID PRN    diphenhydrAMINE (BENADRYL) injection 50 mg  50 mg IntraMUSCular BID PRN    hydrOXYzine HCL (ATARAX) tablet 50 mg  50 mg Oral TID PRN    LORazepam (ATIVAN) 2 mg/mL injection 1 mg  1 mg IntraMUSCular Q4H PRN    acetaminophen (TYLENOL) tablet 650 mg  650 mg Oral Q4H PRN    magnesium hydroxide (MILK OF MAGNESIA) 400 mg/5 mL oral suspension 30 mL  30 mL Oral DAILY PRN      DISCUSSION:   the risks and benefits of the proposed medication  patient given opportunity to ask questions    Lab/Data Review: All lab results for the last 24 hours reviewed. No results found for this or any previous visit (from the past 24 hour(s)). Assessment:     Principal Problem:    Adjustment disorder with depressed mood (8/4/2021)    Active Problems:    Borderline personality disorder ([facility] [address] (8/4/2021)      PTSD (post-traumatic stress disorder) (8/4/2021)        Plan:     Continue current care  Collateral information  - START Remeron 7.5 mg QHS for MDD, titrate if patient amenable  Needs to contact  for legal issues  Disposition planning with social work    I certify that this patient's inpatient psychiatric hospital services furnished since the previous certification were, and continue to be, required for treatment that could reasonably be expected to improve the patient's condition, or for diagnostic study, and that the patient continues to need, on a daily basis, active treatment furnished directly by or requiring the supervision of inpatient psychiatric facility personnel. In addition, the hospital records show that services furnished were intensive treatment services, admission or related services, or equivalent services.   Signed By: Alfredo Vela MD     August 11, 2021

## 2021-08-11 NOTE — BH NOTES
Behavioral Health Treatment Team Note         Patient goal(s) for today: Take medications as prescribed.   Treatment team focus/goals: Medication adjustments.   Progress note: \"I still feel blaze. \" Pt reports he remains anxious about his plan after discharge and bored.  SW spoke with pt's Epifanio ANTHONY team and paperwork for sent to [facility].       LOS:  8                        Expected LOS: TBD     Financial concerns/prescription coverage: [employer] [place] Medicaid - Voluntary  Date of last family contact: None                                  Family requesting physician contact today:  No  Discharge plan: TBD  Guns in the home: None reported.                                                       Outpatient provider(s): To be linked     Participating treatment team members: MICHAEL Garces and Dr. Michael Gallo

## 2021-08-11 NOTE — GROUP NOTE
JOON  GROUP DOCUMENTATION INDIVIDUAL                                                                          Group Therapy Note    Date: 8/11/2021    Group Start Time: 0900  Group End Time: 1000  Group Topic: Topic Group    [address] ACUTE BEHAV [facility]    [place], [address] [facility] GROUP DOCUMENTATION GROUP    Group Therapy Note    Attendees: 9         Attendance: Did not attend    Patient's Goal:      Interventions/techniquesZaria Maldonado

## 2021-08-11 NOTE — PROGRESS NOTES
Report received from [place], [address]: pt up in room, flat and appears to be manipulative with staff. Pt states, \"I want to be discharged to my grandmas in [place]. Im not going to take medication and distraction is what works for me. \" rates anxiety and depression as both being \"at the norm\" of 8. Denies all SI/HI and AVH. Voices no other concerns at this time. Problem: Depressed Mood (Adult/Pediatric)  Goal: *STG: Verbalizes anger, guilt, and other feelings in a constructive manor  Outcome: Progressing Towards Goal  Goal: *STG: Remains safe in hospital  Outcome: Progressing Towards Goal     Problem: Falls - Risk of  Goal: *Absence of Falls  Description: Document Geoff Fall Risk and appropriate interventions in the flowsheet.   Outcome: Progressing Towards Goal  Note: Fall Risk Interventions:            Medication Interventions: Teach patient to arise slowly

## 2021-08-11 NOTE — GROUP NOTE
JOON  GROUP DOCUMENTATION INDIVIDUAL                                                                          Group Therapy Note    Date: 8/11/2021    Group Start Time: 1500  Group End Time: 1600  Group Topic: Recreational/Music Therapy    [facility] - [place] [address] ACUTE BEHAV TH    [address] [facility] GROUP DOCUMENTATION GROUP    Group Therapy Note    Attendees: 6         Attendance: Did not attend    Patient's Goal:      Interventions/techniques:Zaria Maldonado

## 2021-08-11 NOTE — PROGRESS NOTES
Problem: Anxiety  Goal: *Alleviation of anxiety  Outcome: Progressing Towards Goal     Problem: Patient Education: Go to Patient Education Activity  Goal: Patient/Family Education  Outcome: Progressing Towards Goal     Problem: Depressed Mood (Adult/Pediatric)  Goal: *STG: Participates in treatment plan  Outcome: Progressing Towards Goal  Goal: *STG: Verbalizes anger, guilt, and other feelings in a constructive manor  Outcome: Not Progressing Towards Goal

## 2021-08-11 NOTE — BH NOTES
GROUP THERAPY PROGRESS NOTE    Patient did not participate in Discharge Planning Group.      Kevyn Alcazar LPC [facility]

## 2021-08-11 NOTE — PROGRESS NOTES
Patient was irritable during assessment. He complained about not being able to return to [place], feels he'd function better there than in [place]. He was alert and oriented. He denied SI, HI, AVH and pain. States he has some degree of depression. He interacts with staff for the most part, seldom with peers. He had a PM snack. He retited to his room a bit later than the other patients and he appears to be sleeping well at intervals.

## 2021-08-12 PROCEDURE — 99232 SBSQ HOSP IP/OBS MODERATE 35: CPT | Performed by: PSYCHIATRY & NEUROLOGY

## 2021-08-12 PROCEDURE — 65220000003 HC RM SEMIPRIVATE PSYCH

## 2021-08-12 RX ORDER — BUPROPION HYDROCHLORIDE 150 MG/1
150 TABLET ORAL
Status: DISCONTINUED | OUTPATIENT
Start: 2021-08-13 | End: 2021-08-13 | Stop reason: HOSPADM

## 2021-08-12 NOTE — PROGRESS NOTES
Patient was alert and oriented. Denied SI HI AVH and pain. He states he has some depression and anxiety. He states again that he is bored and he appears unable to entertain him self. He interacts with staff more than his peers. He refused his HS med (Remeron) stating he had \"already told his Doctor that it doesn't work for [employer] He ate a snack and appears to be sleeping well.

## 2021-08-12 NOTE — GROUP NOTE
JOON  GROUP DOCUMENTATION INDIVIDUAL                                                                          Group Therapy Note    Date: 8/12/2021    Group Start Time: 1100  Group End Time: 1200  Group Topic: Topic Group    [facility] 3 ACUTE BEHAV TH    [place], [address] [facility] GROUP DOCUMENTATION GROUP    Group Therapy Note    Attendees: 9         Attendance: Attended    Patient's Goal:  To concentrate on selected task    Interventions/techniques: Supported-game    Follows Directions: Followed directions    Interactions: Interacted appropriately    Mental Status: Calm and Flat    Behavior/appearance: Attentive, Cooperative and Needed prompting    Goals Achieved: Able to engage in interactions and Able to listen to others      Additional Notes:   Active participant when encouraged    Yenni Henley

## 2021-08-12 NOTE — PROGRESS NOTES
Problem: Anxiety  Goal: *Alleviation of anxiety  Outcome: Progressing Towards Goal     Problem: Patient Education: Go to Patient Education Activity  Goal: Patient/Family Education  Outcome: Progressing Towards Goal 16

## 2021-08-12 NOTE — PROGRESS NOTES
0800 Patient is alert and cooperative. He denies any disturbance and has been spending time in day room. 1000 Patient ate breakfast In full and spoke with provider. No disturbance observed. Will continue to observe. 1200 Patient has been up at nurses' station and interacting with staff and peers. No disturbance observed will continue to monitor. 1400 Patient is smiling and pleasant. He has been up to nurses' station and in day room. He has been attending groups. No disturbance observed. Will continue to monitor. 1600 Patient in day room and up to nurses' station. Will continue to monitor.

## 2021-08-12 NOTE — BH NOTES
Psychiatric Progress Note    Patient: Dorota Adler MRN: [id number]  SSN: [id number]    YOB: 2000  Age: 21 y.o. Sex: male      Admit Date: 8/3/2021    LOS: 9 days     Subjective:     Dorota Adler  reports feeling tired and irritable due to the noise level on this unit. Moods are fair. Denies SI/HI/AH/VH. No aggression or violence. Appropriately interactive and aware. Tolerating medications well except that he feels none of them. Eating ok and sleeping fairly at 7 hrs.    8/06 - overnight, patient has been visible, he slept 7 hours and denies any depressed mood this morning. He is withdrawn per nursing, and reports anxiety at a '7/10' in intensity. Patient reports feeling confused and disoriented thought he is calm and pleasant on interview. He reports the Doxepin contributed to this, stating it is too intense of a medication. Patient discharge focused, asking what his options were upon release. He denies active thoughts of self harm. 8/07 - no acute overnight events. Patient slept 6 hours, got no PRNs; he remains isolative but is in the day room for meals. He denies SI/HI/AVH/PI. No self harming behaviors elicited. Patient eager for discharge, still working with SW to secure a safe dispo plan. He continues to resist taking any medications, stating that 'nothing works.' Patient declines a new agent for insomnia stating that he has been dealing with this issue for years with no improvement. 8/08 - patient visible, restless, slept 7 hours overnight. Patient endorses depression but denies active thoughts of self harm; he states he is feeling 'off' this morning. Patient calm and cooperative with assessments, he continues to decline any medication for mood or insomnia. He is discharge focused, able to make needs known. 8/09 - patient slept 5 hours, he states that he is sleeping poorly overnight.  His mood is 'blaze.' Per nurse assessment, patient reports depression and anxiety. He is concerned about his experiences on the unit and continues to be in fair behavioral control. Patient denies SI/HI/AVH/PI. He is able to make his needs known, and continues to improve. 8/10 - no acute overnight events. Patient visible, cooperative and pleasant. He denies active thoughts of self harm and is discharge focused. Patient offered and declines medication for symptomatic depression. He is hoping to be discharged to a CSU, SW coordinating.    8/11 - patient slept 5 hours overnight. He continues to report ongoing depression, is reluctant to start medication. Dicussed an agent for sleep and depression, patient is ambivalent but voices understanding that it will be offered to him. Patient manipulative per nursing staff; RASHAWN working in disposition to [facility] followed by Shandra per outpatient team.     8/12 - patient refused medication, stating he has tried it before and it didn't work for him. He remains isolative to his room and with ongoing anxiety and depression. Patient denies SI/HI/AVH, slept 6 hours overnight and got no PRNs. Discussed Wellbutrin as an agent as the patient is concerned about his weight, and he agrees to try this agent.     Objective:     Vitals:    08/10/21 2032 08/11/21 0758 08/11/21 2059 08/12/21 0737   BP: 136/77 133/80 128/76 (!) 156/84   Pulse: 87 77 72 85   Resp: 18 18 20 18   Temp: 98.8 °F (37.1 °C) 97.9 °F (36.6 °C) 98.2 °F (36.8 °C) 98.1 °F (36.7 °C)   SpO2: 98% 99% 98% 97%   Weight:       Height:            Mental Status Exam:   Sensorium  oriented to time, place and person   Relations cooperative   Eye Contact appropriate   Appearance:  age appropriate   Speech:  normal volume and non-pressured   Thought Process: goal directed   Thought Content free of delusions, free of hallucinations, and internally preoccupied    Suicidal ideations none   Mood:  depressed   Affect:  constricted   Memory   adequate   Concentration:  adequate   Insight:  fair   Judgment: limited       MEDICATIONS:  Current Facility-Administered Medications   Medication Dose Route Frequency    [START ON 8/13/2021] buPROPion XL (WELLBUTRIN XL) tablet 150 mg  150 mg Oral 7am    doxepin (SINEquan) capsule 10 mg  10 mg Oral QHS PRN    OLANZapine (ZyPREXA) tablet 5 mg  5 mg Oral Q6H PRN    haloperidol lactate (HALDOL) injection 5 mg  5 mg IntraMUSCular Q6H PRN    benztropine (COGENTIN) tablet 1 mg  1 mg Oral BID PRN    diphenhydrAMINE (BENADRYL) injection 50 mg  50 mg IntraMUSCular BID PRN    hydrOXYzine HCL (ATARAX) tablet 50 mg  50 mg Oral TID PRN    LORazepam (ATIVAN) 2 mg/mL injection 1 mg  1 mg IntraMUSCular Q4H PRN    acetaminophen (TYLENOL) tablet 650 mg  650 mg Oral Q4H PRN    magnesium hydroxide (MILK OF MAGNESIA) 400 mg/5 mL oral suspension 30 mL  30 mL Oral DAILY PRN      DISCUSSION:   the risks and benefits of the proposed medication  patient given opportunity to ask questions    Lab/Data Review: All lab results for the last 24 hours reviewed. No results found for this or any previous visit (from the past 24 hour(s)).       Assessment:     Principal Problem:    Adjustment disorder with depressed mood (8/4/2021)    Active Problems:    Borderline personality disorder ([address] (8/4/2021)      PTSD (post-traumatic stress disorder) (8/4/2021)        Plan:     Continue current care  Collateral information  - DISCONTINUE Remeron 7.5 mg QHS for MDD per patient preference  - START Wellbutrin  mg QDAY for MDD  Needs to contact  for legal issues  Disposition planning with social work    I certify that this patient's inpatient psychiatric hospital services furnished since the previous certification were, and continue to be, required for treatment that could reasonably be expected to improve the patient's condition, or for diagnostic study, and that the patient continues to need, on a daily basis, active treatment furnished directly by or requiring the supervision of inpatient psychiatric facility personnel. In addition, the hospital records show that services furnished were intensive treatment services, admission or related services, or equivalent services.   Signed By: Candice Cox MD     August 12, 2021

## 2021-08-12 NOTE — PROGRESS NOTES
Pharmacy Progress Note    Patient was counseled on the following medication(s): Bupropion  mg. Patient was provided the opportunity to ask questions and all questions were answered. Medication hand-out was offered to the patient.      Thank you,  Rossana John  PharmD Candidate [id number]

## 2021-08-12 NOTE — GROUP NOTE
JOON  GROUP DOCUMENTATION INDIVIDUAL                                                                          Group Therapy Note    Date: 8/12/2021    Group Start Time: 0900  Group End Time: 1000  Group Topic: Topic Group    [facility] [place] [address] ACUTE BEHAV [facility]    [place], [address] [facility] GROUP DOCUMENTATION GROUP    Group Therapy Note    Attendees: 8         Attendance: Attended    Patient's Goal:  To participate in relaxation activity    Interventions/techniques: Supported-game        Interactions: minimal    Mental Status: Flat    Behavior/appearance: Needed prompting    Goals Achieved:  Attended group session      Additional Notes:  Pt arrived late-elected to watch    Ottoniel Pierre

## 2021-08-12 NOTE — PROGRESS NOTES
GROUP THERAPY PROGRESS NOTE      Isaiah Maguire was present for medication group. GROUP TIME: 45 minutes, Thursdays 2pm    PERSONAL GOAL FOR PARTICIPATION: To be present for group, participate in discussion, and answer patient-directed questions. GOAL ORIENTATION: Personal    THERAPEUTIC INTERVENTIONS REVIEWED AND DISCUSSED: The following topics were presented: storage of medications, how to remember to refill medications and keep up with doctor appointments, relapse prevention, keeping a record of all medication including prescription and non-prescription drugs, and who to contact with medication questions. Patients were given time to ask questions regarding their current therapy. IMPRESSION OF PARTICIPATION:  Patient arrived in the day room about 10 minutes after group began and did not join the group, opting to watch TV instead. Towards the end he joined in an off-topic conversation.     Thank you,    Suzanne Mendez  PharmD Candidate [id number] [None] : None

## 2021-08-12 NOTE — BH NOTES
Behavioral Health Treatment Team Note         Patient goal(s) for today: Take medications as prescribed.   Treatment team focus/goals: Medication adjustments.   Progress note: \"I still feel blaze. \" Pt reports he remains anxious about his plan after discharge. Pt in agreement to start Wellbutrin. Pt's valuables now in the care of his  Omar Singh.  Pt to discharge tomorrow and enter [facility] prior to [address] flight to grandmothers.      LOS:  9                       Expected LOS: 8/13     Financial concerns/prescription coverage: [employer] [place] [employer] - Voluntary  Date of last family contact: None                                  Family requesting physician contact today:  No  Discharge plan: TBD  Guns in the home: None reported.                                                       Outpatient provider(s): To be linked     Participating treatment team members: MICHAEL Domingo and Dr. Thi Browning

## 2021-08-12 NOTE — PROGRESS NOTES
Problem: Anxiety  Goal: *Alleviation of anxiety  Outcome: Progressing Towards Goal     Problem: Patient Education: Go to Patient Education Activity  Goal: Patient/Family Education  Outcome: Progressing Towards Goal     Problem: Depressed Mood (Adult/Pediatric)  Goal: *LTG: Understands illness and can identify signs of relapse  Outcome: Progressing Towards Goal  Goal: Interventions  Outcome: Progressing Towards Goal     Problem: Discharge Planning  Goal: *Knowledge of medication management  Outcome: Progressing Towards Goal

## 2021-08-13 VITALS
SYSTOLIC BLOOD PRESSURE: 121 MMHG | BODY MASS INDEX: 35.1 KG/M2 | HEART RATE: 75 BPM | TEMPERATURE: 98.2 F | DIASTOLIC BLOOD PRESSURE: 81 MMHG | RESPIRATION RATE: 18 BRPM | OXYGEN SATURATION: 100 % | HEIGHT: 69 IN | WEIGHT: 237 LBS

## 2021-08-13 PROCEDURE — 99239 HOSP IP/OBS DSCHRG MGMT >30: CPT | Performed by: PSYCHIATRY & NEUROLOGY

## 2021-08-13 PROCEDURE — 74011250637 HC RX REV CODE- 250/637: Performed by: PSYCHIATRY & NEUROLOGY

## 2021-08-13 RX ORDER — BUPROPION HYDROCHLORIDE 150 MG/1
150 TABLET ORAL
Qty: 30 TABLET | Refills: 1 | Status: SHIPPED | OUTPATIENT
Start: 2021-08-14

## 2021-08-13 RX ADMIN — BUPROPION HYDROCHLORIDE 150 MG: 150 TABLET, EXTENDED RELEASE ORAL at 08:31

## 2021-08-13 NOTE — BH NOTES
Pt discharged with parole officers. He was given an AVS, crisis information, and script for medication. There are no noted issues to report at time of discharge. There was no issues with pain or physical issues.

## 2021-08-13 NOTE — DISCHARGE INSTRUCTIONS
Patient Education   If I feel I am at risk of hurting myself or others, I will call the crisis office and speak with a crisis worker who will assist me during my crisis. [address]  [phone]  [facility] [place] [address] [phone]  [facility]-  [phone]    Adjustment Disorder: Care Instructions  Your Care Instructions     Adjustment disorder means that you have emotional or behavioral problems because of stress. But your response to the stress is far more severe than a normal response. It is severe enough to affect your work or social life and may cause depression and physical pains and problems. Events that may cause this response can include a divorce, money problems, or starting school or a new job. It might be anything that causes some stress. This disorder is most often a short-term problem. It happens within 3 months of the stressful event or change. If the response lasts longer than 6 months after the event ends, you may have a more serious disorder. Follow-up care is a key part of your treatment and safety. Be sure to make and go to all appointments, and call your doctor if you are having problems. It's also a good idea to know your test results and keep a list of the medicines you take. How can you care for yourself at home? · Go to all counseling sessions. Do not skip any because you are feeling better. · If your doctor prescribed medicines, take them exactly as prescribed. Call your doctor if you think you are having a problem with your medicine. You will get more details on the specific medicines your doctor prescribes. · Discuss the causes of your stress with a good friend or family member. Or you can join a support group for people with similar problems. Talking to others sometimes relieves stress.   · Get at least 30 minutes of exercise on most days of the week. Walking is a good choice. You also may want to do other activities, such as running, swimming, cycling, or playing tennis or team sports. Relaxation techniques  Do relaxation exercises 10 to 20 minutes a day. You can play soothing, relaxing music while you do them, if you wish. · Tell others in your house that you are going to do your relaxation exercises. Ask them not to disturb you. · Find a comfortable, quiet place. · Lie down on your back, or sit with your back straight. · Focus on your breathing. Make it slow and steady. · Breathe in through your nose. Breathe out through either your nose or mouth. · Breathe deeply, filling up the area between your navel and your rib cage. Breathe so that your belly goes up and down. · Do not hold your breath. · Breathe like this for 5 to 10 minutes. Notice the feeling of calmness throughout your whole body. As you continue to breathe slowly and deeply, relax by doing these next steps for another 5 to 10 minutes:  · Tighten and relax each muscle group in your body. Start at your toes, and work your way up to your head. · Imagine your muscle groups relaxing and getting heavy. · Empty your mind of all thoughts. · Let yourself relax more and more deeply. · Be aware of the state of calmness that surrounds you. · When your relaxation time is over, you can bring yourself back to alertness by moving your fingers and toes. Then move your hands and feet. And then move your entire body. Sometimes people fall asleep during relaxation. But they most often wake up soon. · Always give yourself time to return to full alertness before you drive a car. Wait to do anything that might cause an accident if you are not fully alert. Never play a relaxation tape while you drive a car. When should you call for help? Call 911 anytime you think you may need emergency care. For example, call if:    · You feel you cannot stop from hurting yourself or someone else.  Keep the numbers for these national suicide hotlines: 3-383-112-TALK (3-280.851.6398) and 7-419-FOWBBFF (8-238.226.2136). If you or someone you know talks about suicide or feeling hopeless, get help right away. Watch closely for changes in your health, and be sure to contact your doctor if:    · You have new anxiety, or your anxiety gets worse.     · You have been feeling sad, depressed, or hopeless or have lost interest in things that you usually enjoy.     · You do not get better as expected. Where can you learn more? Go to http://www.gray.com/  Enter R087 in the search box to learn more about \"Adjustment Disorder: Care Instructions. \"  Current as of: August 31, 2020               Content Version: 12.8  © 0360-8811 [employer]. Care instructions adapted under license by [employer] (which disclaims liability or warranty for this information). If you have questions about a medical condition or this instruction, always ask your healthcare professional. [address] any warranty or liability for your use of this information. Malathi Rojo LMSW

## 2021-08-13 NOTE — DISCHARGE SUMMARY
PSYCHIATRIC DISCHARGE SUMMARY         IDENTIFICATION:    Patient Name  Percy Marin   Date of Birth 2000   [facility] [id number]   Medical Record Number  [id number]      Age  21 y.o.    PCP None   Admit date:  8/3/2021    Discharge date: 8/13/2021   Room Number  323/02  @ [address]   Date of Service  8/13/2021            TYPE OF DISCHARGE: REGULAR               CONDITION AT DISCHARGE: improved and fair       PROVISIONAL & DISCHARGE DIAGNOSES:    Problem List  Date Reviewed: 11/2/2017        Codes Class    Borderline personality disorder ([facility] ICD-10-CM: F60.3  ICD-9-CM: 301.83         PTSD (post-traumatic stress disorder) ICD-10-CM: F43.10  ICD-9-CM: 309.81         * (Principal) Adjustment disorder with depressed mood ICD-10-CM: F43.21  ICD-9-CM: 309.0         Polysubstance overdose ICD-10-CM: T50.901A  ICD-9-CM: 977.9, E980.5         Lives in group home ICD-10-CM: Z59.3  ICD-9-CM: V60.6     Overview Signed 11/2/2017 10:53 AM by Zena Cruz,      will be placed in a group home             History of posttraumatic stress disorder (PTSD) ICD-10-CM: Z86.59  ICD-9-CM: V11.8         History of depression ICD-10-CM: Z86.59  ICD-9-CM: V11.8         History of anxiety ICD-10-CM: Z86.59  ICD-9-CM: V11.8         S/P tonsillectomy and adenoidectomy ICD-10-CM: Z90.89  ICD-9-CM: V45.89         Family history of diabetes mellitus ICD-10-CM: Z83.3  ICD-9-CM: V18.0         Screening for hypercholesterolemia ICD-10-CM: Z13.220  ICD-9-CM: V77.91         Dental caries ICD-10-CM: K02.9  ICD-9-CM: 521.00         Poor sleep hygiene ICD-10-CM: Z72.821  ICD-9-CM: 307.49               Active Hospital Problems    Borderline personality disorder ([facility]      PTSD (post-traumatic stress disorder)      *Adjustment disorder with depressed mood        DISCHARGE DIAGNOSIS:   Axis I:  SEE ABOVE  Axis II: SEE ABOVE  Axis III: SEE ABOVE  Axis IV:  lack of structure  Axis V:  30 on admission, 75 on discharge     CC & HISTORY OF PRESENT ILLNESS:  \"suicidal ideation\"    The patient, Nicky Benavides, is a 21 y.o. WHITE/NON- male with a past psychiatric history significant for PTSD, who presents at this time with complaints of (and/or evidence of) the following emotional symptoms: suicidal thoughts/threats and agitation. Additional symptomatology include mood lability. The above symptoms have been present for 24+ hours. These symptoms are of moderate to high severity. These symptoms are fleeting in nature. The patient's condition has been precipitated by psychosocial stressors. Patient's condition made worse by treatment noncompliance. UDS: negative; BAL=0.      Per admission documentation, patient threatened suicide by several different means, including overdose, cutting, and jumping from height. He removed his own ankle tracing device and told facilitators he was in crisis prior to the event. On the unit, he has been able to make his needs known and is cooperative with assessments.      The patient is a fair historian. The patient corroborates the above narrative. The patient contracts for safety on the unit and gives consent for the team to contact collateral. The patient is amenable to initiating treatment while on the unit. He states that he has a limited support system as he has no friends and is remanded to a transitional living house. 8/06 - overnight, patient has been visible, he slept 7 hours and denies any depressed mood this morning. He is withdrawn per nursing, and reports anxiety at a '7/10' in intensity. Patient reports feeling confused and disoriented thought he is calm and pleasant on interview. He reports the Doxepin contributed to this, stating it is too intense of a medication. Patient discharge focused, asking what his options were upon release. He denies active thoughts of self harm.     8/07 - no acute overnight events.  Patient slept 6 hours, got no PRNs; he remains isolative but is in the day room for meals. He denies SI/HI/AVH/PI. No self harming behaviors elicited. Patient eager for discharge, still working with RASHAWN to secure a safe dispo plan. He continues to resist taking any medications, stating that 'nothing works.' Patient declines a new agent for insomnia stating that he has been dealing with this issue for years with no improvement.      8/08 - patient visible, restless, slept 7 hours overnight. Patient endorses depression but denies active thoughts of self harm; he states he is feeling 'off' this morning. Patient calm and cooperative with assessments, he continues to decline any medication for mood or insomnia. He is discharge focused, able to make needs known.     8/09 - patient slept 5 hours, he states that he is sleeping poorly overnight. His mood is 'blaze.' Per nurse assessment, patient reports depression and anxiety. He is concerned about his experiences on the unit and continues to be in fair behavioral control. Patient denies SI/HI/AVH/PI. He is able to make his needs known, and continues to improve.     8/10 - no acute overnight events. Patient visible, cooperative and pleasant. He denies active thoughts of self harm and is discharge focused. Patient offered and declines medication for symptomatic depression. He is hoping to be discharged to a CSU, SW coordinating.     8/11 - patient slept 5 hours overnight. He continues to report ongoing depression, is reluctant to start medication. Dicussed an agent for sleep and depression, patient is ambivalent but voices understanding that it will be offered to him. Patient manipulative per nursing staff; RASHANW working in disposition to [place] [id number] followed by [place] per outpatient team.      8/12 - patient refused medication, stating he has tried it before and it didn't work for him. He remains isolative to his room and with ongoing anxiety and depression. Patient denies SI/HI/AVH, slept 6 hours overnight and got no PRNs.  Discussed Wellbutrin as an agent as the patient is concerned about his weight, and he agrees to try this agent. SOCIAL HISTORY:    Social History     Socioeconomic History    Marital status: SINGLE     Spouse name: Not on file    Number of children: Not on file    Years of education: Not on file    Highest education level: Not on file   Occupational History    Not on file   Tobacco Use    Smoking status: Never Smoker    Smokeless tobacco: Never Used   Substance and Sexual Activity    Alcohol use: No    Drug use: No    Sexual activity: Never   Other Topics Concern    Not on file   Social History Narrative    ** Merged History Encounter **          Social Determinants of Health     Financial Resource Strain:     Difficulty of Paying Living Expenses:    Food Insecurity:     Worried About Running Out of Food in the Last Year:     [address] in the Last Year:    Transportation Needs:     Lack of Transportation (Medical):      Lack of Transportation (Non-Medical):    Physical Activity:     Days of Exercise per Week:     Minutes of Exercise per Session:    Stress:     Feeling of Stress :    Social Connections:     Frequency of Communication with Friends and Family:     Frequency of Social Gatherings with Friends and Family:     Attends Voodoo Services:     Active Member of Clubs or Organizations:     Attends Club or Organization Meetings:     Marital Status:    Intimate Partner Violence:     Fear of Current or Ex-Partner:     Emotionally Abused:     Physically Abused:     Sexually Abused:       FAMILY HISTORY:   Family History   Problem Relation Age of Onset    Diabetes Mother     Depression Mother     Bipolar Disorder Mother     Diabetes Maternal Grandmother     Heart Disease Maternal Grandmother     Hypertension Maternal Grandmother              HOSPITALIZATION COURSE:        Prashanth Brewer was admitted to the inpatient psychiatric unit [facility] for acute psychiatric stabilization in regards to symptomatology as described in the HPI above. The differential diagnosis at time of admission included: MDD vs adjustment disorder. While on the unit Pam Estes was involved in individual, group, occupational and milieu therapy. Psychiatric medications were adjusted during this hospitalization including Wellbutrin. Pam Estes demonstrated a slow, but progressive improvement in overall condition. Much of patient's initial presentation appeared to be related to situational stressors, effects of medication non-compliance and psychological factors. Please see individual progress notes for more specific details regarding patient's hospitalization course. Patient with request for discharge today. There are no grounds to seek a TDO. At time of discharge, Pam Estes is without significant problems of depression, psychosis, or caroline. Patient free of suicidal and homicidal ideations (appears to be at very low risk of suicide or homicide) and reports many positive predictive factors in terms of not attempting suicide or homicide. Overall presentation at time of discharge is most consistent with the diagnosis of adjustment disorder with depressed mood. Patient has maximized benefit to be derived from acute inpatient psychiatric treatment. All members of the treatment team concur with each other in regards to plans for discharge today. Patient and outpatient team aware and in agreement with discharge and discharge plan.          LABS AND IMAGAING:    Labs Reviewed   LIPID PANEL - Abnormal; Notable for the following components:       Result Value    Triglyceride 354 (*)     All other components within normal limits   TSH 3RD GENERATION   GLUCOSE, FASTING     No results found for: DS35, PHEN, PHENO, PHENT, DILF, DS39, PHENY, PTN, VALF2, VALAC, VALP, VALPR, DS6, CRBAM, CRBAMP, CARB2, XCRBAM  Admission on 08/03/2021   Component Date Value Ref Range Status    TSH 08/04/2021 1.36  0.36 - 3.74 uIU/mL Final    Cholesterol, total 08/04/2021 163  <200 MG/DL Final    Triglyceride 08/04/2021 354* <150 MG/DL Final    HDL Cholesterol 08/04/2021 37  MG/DL Final    LDL, calculated 08/04/2021 55.2  0 - 100 MG/DL Final    VLDL, calculated 08/04/2021 70.8  MG/DL Final    CHOL/HDL Ratio 08/04/2021 4.4  0.0 - 5.0   Final    Glucose 08/04/2021 85  65 - 100 MG/DL Final   Admission on 08/02/2021, Discharged on 08/03/2021   Component Date Value Ref Range Status    WBC 08/02/2021 10.2  4.1 - 11.1 K/uL Final    RBC 08/02/2021 5.24  4.10 - 5.70 M/uL Final    HGB 08/02/2021 16.1  12.1 - 17.0 g/dL Final    HCT 08/02/2021 45.7  36.6 - 50.3 % Final    MCV 08/02/2021 87.2  80.0 - 99.0 FL Final    MCH 08/02/2021 30.7  26.0 - 34.0 PG Final    MCHC 08/02/2021 35.2  30.0 - 36.5 g/dL Final    RDW 08/02/2021 12.5  11.5 - 14.5 % Final    PLATELET 19/28/9478 377  150 - 400 K/uL Final    MPV 08/02/2021 9.1  8.9 - 12.9 FL Final    NRBC 08/02/2021 0.0  0  WBC Final    ABSOLUTE NRBC 08/02/2021 0.00  0.00 - 0.01 K/uL Final    NEUTROPHILS 08/02/2021 65  32 - 75 % Final    LYMPHOCYTES 08/02/2021 23  12 - 49 % Final    MONOCYTES 08/02/2021 9  5 - 13 % Final    EOSINOPHILS 08/02/2021 1  0 - 7 % Final    BASOPHILS 08/02/2021 1  0 - 1 % Final    IMMATURE GRANULOCYTES 08/02/2021 1* 0.0 - 0.5 % Final    ABS. NEUTROPHILS 08/02/2021 6.8  1.8 - 8.0 K/UL Final    ABS. LYMPHOCYTES 08/02/2021 2.3  0.8 - 3.5 K/UL Final    ABS. MONOCYTES 08/02/2021 0.9  0.0 - 1.0 K/UL Final    ABS. EOSINOPHILS 08/02/2021 0.1  0.0 - 0.4 K/UL Final    ABS. BASOPHILS 08/02/2021 0.1  0.0 - 0.1 K/UL Final    ABS. IMM.  GRANS. 08/02/2021 0.1* 0.00 - 0.04 K/UL Final    DF 08/02/2021 AUTOMATED    Final    Sodium 08/02/2021 141  136 - 145 mmol/L Final    Potassium 08/02/2021 4.1  3.5 - 5.1 mmol/L Final    Chloride 08/02/2021 107  97 - 108 mmol/L Final    CO2 08/02/2021 24  21 - 32 mmol/L Final    Anion gap 08/02/2021 10  5 - 15 mmol/L Final    Glucose 08/02/2021 105* 65 - 100 mg/dL Final    BUN 08/02/2021 14  6 - 20 MG/DL Final    Creatinine 08/02/2021 0.83  0.70 - 1.30 MG/DL Final    BUN/Creatinine ratio 08/02/2021 17  12 - 20   Final    GFR est AA 08/02/2021 >60  >60 ml/min/1.73m2 Final    GFR est non-AA 08/02/2021 >60  >60 ml/min/1.73m2 Final    Calcium 08/02/2021 8.9  8.5 - 10.1 MG/DL Final    Bilirubin, total 08/02/2021 0.4  0.2 - 1.0 MG/DL Final    ALT (SGPT) 08/02/2021 53  12 - 78 U/L Final    AST (SGOT) 08/02/2021 24  15 - 37 U/L Final    Alk.  phosphatase 08/02/2021 78  45 - 117 U/L Final    Protein, total 08/02/2021 7.6  6.4 - 8.2 g/dL Final    Albumin 08/02/2021 3.5  3.5 - 5.0 g/dL Final    Globulin 08/02/2021 4.1* 2.0 - 4.0 g/dL Final    A-G Ratio 08/02/2021 0.9* 1.1 - 2.2   Final    ALCOHOL(ETHYL),SERUM 08/02/2021 <10  <10 MG/DL Final    Troponin-I, Qt. 08/02/2021 <0.05  <0.05 ng/mL Final    AMPHETAMINES 08/02/2021 Negative  NEG   Final    BARBITURATES 08/02/2021 Negative  NEG   Final    BENZODIAZEPINES 08/02/2021 Negative  NEG   Final    COCAINE 08/02/2021 Negative  NEG   Final    METHADONE 08/02/2021 Negative  NEG   Final    OPIATES 08/02/2021 Negative  NEG   Final    PCP(PHENCYCLIDINE) 08/02/2021 Negative  NEG   Final    THC (TH-CANNABINOL) 08/02/2021 Negative  NEG   Final    Drug screen comment 08/02/2021 (NOTE)   Final    Ventricular Rate 08/02/2021 90  BPM Final    Atrial Rate 08/02/2021 90  BPM Final    P-R Interval 08/02/2021 158  ms Final    QRS Duration 08/02/2021 86  ms Final    Q-T Interval 08/02/2021 344  ms Final    QTC Calculation (Bezet) 08/02/2021 420  ms Final    Calculated P Axis 08/02/2021 56  degrees Final    Calculated R Axis 08/02/2021 15  degrees Final    Calculated T Axis 08/02/2021 19  degrees Final    Diagnosis 08/02/2021    Final                    Value:Normal sinus rhythm  Early repolarization  Normal ECG  No previous ECGs available  Confirmed by Edith Mcconnell M.D., [place] ([id number]) on 8/3/2021 9:84:69 AM      Salicylate level 12/55/8019 <1.7* 2.8 - 20.0 MG/DL Final    Magnesium 08/02/2021 1.8  1.6 - 2.4 mg/dL Final    CK 08/02/2021 101  39 - 308 U/L Final    Acetaminophen level 08/02/2021 <2* 10 - 30 ug/mL Final    WBC 08/03/2021 9.8  4.1 - 11.1 K/uL Final    RBC 08/03/2021 5.18  4.10 - 5.70 M/uL Final    HGB 08/03/2021 15.7  12.1 - 17.0 g/dL Final    HCT 08/03/2021 45.3  36.6 - 50.3 % Final    MCV 08/03/2021 87.5  80.0 - 99.0 FL Final    MCH 08/03/2021 30.3  26.0 - 34.0 PG Final    MCHC 08/03/2021 34.7  30.0 - 36.5 g/dL Final    RDW 08/03/2021 12.6  11.5 - 14.5 % Final    PLATELET 26/04/4083 517  150 - 400 K/uL Final    MPV 08/03/2021 9.0  8.9 - 12.9 FL Final    NRBC 08/03/2021 0.0  0  WBC Final    ABSOLUTE NRBC 08/03/2021 0.00  0.00 - 0.01 K/uL Final    Sodium 08/03/2021 140  136 - 145 mmol/L Final    Potassium 08/03/2021 3.5  3.5 - 5.1 mmol/L Final    Chloride 08/03/2021 104  97 - 108 mmol/L Final    CO2 08/03/2021 25  21 - 32 mmol/L Final    Anion gap 08/03/2021 11  5 - 15 mmol/L Final    Glucose 08/03/2021 120* 65 - 100 mg/dL Final    BUN 08/03/2021 12  6 - 20 MG/DL Final    Creatinine 08/03/2021 0.81  0.70 - 1.30 MG/DL Final    BUN/Creatinine ratio 08/03/2021 15  12 - 20   Final    GFR est AA 08/03/2021 >60  >60 ml/min/1.73m2 Final    GFR est non-AA 08/03/2021 >60  >60 ml/min/1.73m2 Final    Calcium 08/03/2021 8.5  8.5 - 10.1 MG/DL Final    Bilirubin, total 08/03/2021 0.3  0.2 - 1.0 MG/DL Final    ALT (SGPT) 08/03/2021 47  12 - 78 U/L Final    AST (SGOT) 08/03/2021 21  15 - 37 U/L Final    Alk.  phosphatase 08/03/2021 74  45 - 117 U/L Final    Protein, total 08/03/2021 7.1  6.4 - 8.2 g/dL Final    Albumin 08/03/2021 3.3* 3.5 - 5.0 g/dL Final    Globulin 08/03/2021 3.8  2.0 - 4.0 g/dL Final    A-G Ratio 08/03/2021 0.9* 1.1 - 2.2   Final    Acetaminophen level 08/03/2021 <2* 10 - 30 ug/mL Final  Salicylate level 05/04/8096 <1.7* 2.8 - 20.0 MG/DL Final    Ventricular Rate 08/03/2021 70  BPM Final    Atrial Rate 08/03/2021 70  BPM Final    P-R Interval 08/03/2021 164  ms Final    QRS Duration 08/03/2021 90  ms Final    Q-T Interval 08/03/2021 398  ms Final    QTC Calculation (Bezet) 08/03/2021 429  ms Final    Calculated P Axis 08/03/2021 53  degrees Final    Calculated R Axis 08/03/2021 19  degrees Final    Calculated T Axis 08/03/2021 13  degrees Final    Diagnosis 08/03/2021    Final                    Value:Normal sinus rhythm  Early repolarization  Normal ECG  When compared with ECG of 02-AUG-2021 22:26,  No significant change was found  Confirmed by Tresa Diaz M.D., [place] ([id number]) on 8/4/2021 7:48:30 AM      SARS-CoV-2 08/03/2021 Not detected  NOTD   Final    Influenza A by PCR 08/03/2021 Not detected    Final    Influenza B by PCR 08/03/2021 Not detected    Final     No results found. DISPOSITION:    Home. Patient to f/u with psychiatric, and psychotherapy appointments. Patient is to f/u with internist as directed. FOLLOW-UP CARE:    Activity as tolerated  Regular diet  Wound Care: none needed. Follow-up Information     Follow up With Specialties Details Why [address] today This is the facility you will be stepping down to today. Your team will be scheduling your follow up including medication management, therapy, skill building in [address] [place] [address] upon discharge from this facility. Address: [address] [place] [address], [address] [place] [address]  Phone: [phone]      None    None ([phone]) Patient stated that they have no PCP                   PROGNOSIS:   Fair ---- based on nature of patient's pathology/ies and treatment compliance issues. Prognosis is greatly dependent upon patient's ability to remain sober and to follow up with scheduled appointments as well as to comply with psychiatric medications as prescribed.             DISCHARGE MEDICATIONS:     Informed consent given for the use of following psychotropic medications:  Current Discharge Medication List      START taking these medications    Details   buPROPion XL (WELLBUTRIN XL) 150 mg tablet Take 1 Tablet by mouth every morning. Indications: major depressive disorder  Qty: 30 Tablet, Refills: 1  Start date: 8/14/2021         STOP taking these medications       FLUoxetine (PROzac) 20 mg capsule Comments:   Reason for Stopping:         CHOLECALCIFEROL, VITAMIN D3, (VITAMIN D3 PO) Comments:   Reason for Stopping:                      A coordinated, multidisplinary treatment team round was conducted with Mar Aram is done daily here at [facility]. This team consists of the nurse, psychiatric unit pharmacist,  and writer. I have spent greater than 35 minutes on discharge work.     Signed:  Alfredo Vela MD  8/13/2021

## 2021-08-13 NOTE — BH NOTES
Behavioral Health Transition Record to Provider    Patient Name: Ryan Birch  YOB: 2000  Medical Record Number: [id number]  Date of Admission: 8/3/2021  Date of Discharge: 8/13/2021    Attending Provider: Ankur Hurt, *  Discharging Provider: Micheal Clemens MD  To contact this individual call [phone] and ask the  to page. If unavailable, ask to be transferred to [facility] Provider on call. [facility] Provider will be available on call 24/7 and during holidays. Primary Care Provider: None    Allergies   Allergen Reactions    Amoxicillin Hives and Rash     Swelling in throat, tongue    Amoxicillin Hives       Reason for Admission: Depression     Admission Diagnosis: Depression [F32.9]    * No surgery found *    Results for orders placed or performed during the hospital encounter of 08/03/21   TSH 3RD GENERATION   Result Value Ref Range    TSH 1.36 0.36 - 3.74 uIU/mL   LIPID PANEL   Result Value Ref Range    Cholesterol, total 163 <200 MG/DL    Triglyceride 354 (H) <150 MG/DL    HDL Cholesterol 37 MG/DL    LDL, calculated 55.2 0 - 100 MG/DL    VLDL, calculated 70.8 MG/DL    CHOL/HDL Ratio 4.4 0.0 - 5.0     GLUCOSE, FASTING   Result Value Ref Range    Glucose 85 65 - 100 MG/DL       Immunizations administered during this encounter:   Immunization History   Administered Date(s) Administered    DTaP 10/05/2011    Hep A Vaccine 10/05/2011    Influenza Vaccine 10/05/2011       Screening for Metabolic Disorders for Patients on Antipsychotic Medications  (Data obtained from the EMR)    Estimated Body Mass Index  Estimated body mass index is 35 kg/m² as calculated from the following:    Height as of this encounter: 5' 9\" (1.753 m). Weight as of this encounter: 107.5 kg (237 lb).      Vital Signs/Blood Pressure  Visit Vitals  /81   Pulse 75   Temp 98.2 °F (36.8 °C)   Resp 18   Ht 5' 9\" (1.753 m)   Wt 107.5 kg (237 lb)   SpO2 100%   BMI 35.00 kg/m²       Blood Glucose/Hemoglobin A1c  Lab Results   Component Value Date/Time    Glucose 85 08/04/2021 04:52 AM       No results found for: HBA1C, [id number]     Lipid Panel  Lab Results   Component Value Date/Time    Cholesterol, total 163 08/04/2021 04:52 AM    HDL Cholesterol 37 08/04/2021 04:52 AM    LDL, calculated 55.2 08/04/2021 04:52 AM    Triglyceride 354 (H) 08/04/2021 04:52 AM    CHOL/HDL Ratio 4.4 08/04/2021 04:52 AM        Discharge Diagnosis: Please refer to physician's discharge plan. Discharge Plan:   The patient Verenice Gupta exhibits the ability to control behavior in a less restrictive environment. Patient's level of functioning is improving. No assaultive/destructive behavior has been observed for the past 24 hours. No suicidal/homicidal threat or behavior has been observed for the past 24 hours. There is no evidence of serious medication side effects. Patient has not been in physical or protective restraints for at least the past 24 hours. Discharge Medication List and Instructions:   Current Discharge Medication List      START taking these medications    Details   buPROPion XL (WELLBUTRIN XL) 150 mg tablet Take 1 Tablet by mouth every morning. Indications: major depressive disorder  Qty: 30 Tablet, Refills: 1  Start date: 8/14/2021         STOP taking these medications       FLUoxetine (PROzac) 20 mg capsule Comments:   Reason for Stopping:         CHOLECALCIFEROL, VITAMIN D3, (VITAMIN D3 PO) Comments:   Reason for Stopping:               Unresulted Labs (24h ago, onward)    None        To obtain results of studies pending at discharge, please contact N/A    Follow-up Information     Follow up With Specialties Details Why [facility] today This is the facility you will be stepping down to today.  Your team will be scheduling your follow up including medication management, therapy, skill building in [address] upon discharge from this facility. Address: [address] [place] [address]  Phone: [phone]      None    None (697) Patient stated that they have no PCP          Advanced Directive:   Does the patient have an appointed surrogate decision maker? Unknown   Does the patient have a Medical Advance Directive? Unknown   Does the patient have a Psychiatric Advance Directive? Unknown   If the patient does not have a surrogate or Medical Advance Directive AND Psychiatric Advance Directive, the patient was offered information on these advance directives. Unknown     Patient Instructions: Please continue all medications until otherwise directed by physician. Tobacco Cessation Discharge Plan:   Is the patient a smoker and needs referral for smoking cessation? No  Patient referred to the following for smoking cessation with an appointment? No   Patient was offered medication to assist with smoking cessation at discharge? No  Was education for smoking cessation added to the discharge instructions? No     Alcohol/Substance Abuse Discharge Plan:   Does the patient have a history of substance/alcohol abuse and requires a referral for treatment? Yes  Patient referred to the following for substance/alcohol abuse treatment with an appointment? Yes  Patient was offered medication to assist with alcohol cessation at discharge? No  Was education for substance/alcohol abuse added to discharge instructions? Yes     Patient discharged to [place] [address]; provided to the patient/caregiver either in hard copy or electronically. Continuing care paperwork was faxed to community mental health providers.

## 2021-08-13 NOTE — PROGRESS NOTES
Patient is pleasant and cooperative with unit routine. He was social with staff and peers. He had a snack. He denied SI, HI, AVH and pain. He admitted to a small amount of nervousness about being D/C'd tomorrow. Reassurance given.

## 2021-08-13 NOTE — PROGRESS NOTES
Problem: Anxiety  Goal: *Alleviation of anxiety  Outcome: Progressing Towards Goal     Problem: Depressed Mood (Adult/Pediatric)  Goal: *STG: Complies with medication therapy  Outcome: Not Progressing Towards Goal

## 2021-08-13 NOTE — PROGRESS NOTES
Problem: Depressed Mood (Adult/Pediatric)  Goal: *STG: Participates in treatment plan  Outcome: Progressing Towards Goal  Goal: *STG: Remains safe in hospital  Outcome: Progressing Towards Goal     Report received from off going nurse, assumed care of resident. Pt reports poor sleep with some disturbing dreams. He reports that he is anxious about discharge. Pt denies all SI/HI, AVH, and depression. There are no noted behavioral issues at this time. Will continue to monitor for condition changes.

## 2023-06-26 NOTE — BH NOTES
Please see the attached refill request.   Psychiatric Progress Note    Patient: Anton Cruz MRN: [id number]  SSN: [id number]    YOB: 2000  Age: 21 y.o. Sex: male      Admit Date: 8/3/2021    LOS: 4 days     Subjective:     Anton Cruz  reports feeling tired and irritable due to the noise level on this unit. Moods are fair. Denies SI/HI/AH/VH. No aggression or violence. Appropriately interactive and aware. Tolerating medications well except that he feels none of them. Eating ok and sleeping fairly at 7 hrs.    8/06 - overnight, patient has been visible, he slept 7 hours and denies any depressed mood this morning. He is withdrawn per nursing, and reports anxiety at a '7/10' in intensity. Patient reports feeling confused and disoriented thought he is calm and pleasant on interview. He reports the Doxepin contributed to this, stating it is too intense of a medication. Patient discharge focused, asking what his options were upon release. He denies active thoughts of self harm. 8/07 - no acute overnight events. Patient slept 6 hours, got no PRNs; he remains isolative but is in the day room for meals. He denies SI/HI/AVH/PI. No self harming behaviors elicited. Patient eager for discharge, still working with SW to secure a safe dispo plan. He continues to resist taking any medications, stating that 'nothing works.' Patient declines a new agent for insomnia stating that he has been dealing with this issue for years with no improvement.      Objective:     Vitals:    08/06/21 0957 08/06/21 2030 08/07/21 0739 08/2000   BP: 121/78 128/76 115/84 133/80   Pulse: 88 (!) 112 69 89   Resp: 16 18 18    Temp: 98.9 °F (37.2 °C) 98.3 °F (36.8 °C) 98.6 °F (37 °C) 98.6 °F (37 °C)   SpO2: 99% 99% 99% 99%   Weight:       Height:            Mental Status Exam:   Sensorium  oriented to time, place and person   Relations cooperative   Eye Contact appropriate   Appearance:  age appropriate   Speech:  normal volume and non-pressured   Thought Process: goal directed   Thought Content free of delusions, free of hallucinations, and internally preoccupied    Suicidal ideations none   Mood:  depressed   Affect:  constricted   Memory   adequate   Concentration:  adequate   Insight:  fair   Judgment:  limited       MEDICATIONS:  Current Facility-Administered Medications   Medication Dose Route Frequency    doxepin (SINEquan) capsule 10 mg  10 mg Oral QHS PRN    OLANZapine (ZyPREXA) tablet 5 mg  5 mg Oral Q6H PRN    haloperidol lactate (HALDOL) injection 5 mg  5 mg IntraMUSCular Q6H PRN    benztropine (COGENTIN) tablet 1 mg  1 mg Oral BID PRN    diphenhydrAMINE (BENADRYL) injection 50 mg  50 mg IntraMUSCular BID PRN    hydrOXYzine HCL (ATARAX) tablet 50 mg  50 mg Oral TID PRN    LORazepam (ATIVAN) injection 1 mg  1 mg IntraMUSCular Q4H PRN    acetaminophen (TYLENOL) tablet 650 mg  650 mg Oral Q4H PRN    magnesium hydroxide (MILK OF MAGNESIA) 400 mg/5 mL oral suspension 30 mL  30 mL Oral DAILY PRN      DISCUSSION:   the risks and benefits of the proposed medication  patient given opportunity to ask questions    Lab/Data Review: All lab results for the last 24 hours reviewed. No results found for this or any previous visit (from the past 24 hour(s)).       Assessment:     Principal Problem:    Adjustment disorder with depressed mood (8/4/2021)    Active Problems:    Borderline personality disorder ([facility] [address] (8/4/2021)      PTSD (post-traumatic stress disorder) (8/4/2021)        Plan:     Continue current care  Collateral information  - CHANGE Doxepin to PRN  Needs to contact  for legal issues  Disposition planning with social work    I certify that this patient's inpatient psychiatric hospital services furnished since the previous certification were, and continue to be, required for treatment that could reasonably be expected to improve the patient's condition, or for diagnostic study, and that the patient continues to need, on a daily basis, active treatment furnished directly by or requiring the supervision of inpatient psychiatric facility personnel. In addition, the hospital records show that services furnished were intensive treatment services, admission or related services, or equivalent services.   Signed By: Isidro Taylor MD     August 7, 2021

## 2025-05-05 NOTE — GROUP NOTE
Be Leblanc comes into clinic today at the request of Dr. Martinez Ordering Provider for Wound Check Action taken: See Below.    This service provided today was under the supervising provider of the day Dr. Martinez, who was available if needed.    Pt returned to clinic for post surgery 1 week follow up bandage change. Pt has no complaints, denies pain. Small amount of dry blood noted on bandage. Bandage removed from Scalp, area cleansed with normal saline. Site is healing and wound edges approximating well. Reapplied new steri strips and paper tape.    Advised to watch for signs/sx of infection; spreading redness, drainage, odor, fever. Call or report promptly to clinic. Pt given written instructions and informed to rtc as needed. Patient verbalized understanding.     Tammi Jesus LPN   5/5/25   [facility]  GROUP DOCUMENTATION INDIVIDUAL                                                                          Group Therapy Note    Date: 8/12/2021    Group Start Time: 1500  Group End Time: 1600  Group Topic: Recreational/Music Therapy    [facility] [place] [id number] ACUTE BEHAV TH    [place], [address] [facility] GROUP DOCUMENTATION GROUP    Group Therapy Note    Attendees: 9         Attendance: Attended    Patient's Goal:  To concentrate on selected task    Interventions/techniques: Supported-rafts,games,music    Follows Directions: Followed directions    Interactions: Interacted appropriately    Mental Status: Calm    Behavior/appearance: Attentive and Cooperative    Goals Achieved: Able to engage in interactions and Able to listen to others      Additional Notes:   Active participant in [address] [place] [address]